# Patient Record
Sex: FEMALE | Race: WHITE | Employment: OTHER
[De-identification: names, ages, dates, MRNs, and addresses within clinical notes are randomized per-mention and may not be internally consistent; named-entity substitution may affect disease eponyms.]

---

## 2017-08-05 ENCOUNTER — HEALTH MAINTENANCE LETTER (OUTPATIENT)
Age: 72
End: 2017-08-05

## 2019-06-14 ENCOUNTER — HOSPITAL ENCOUNTER (INPATIENT)
Facility: CLINIC | Age: 74
LOS: 2 days | Discharge: HOME | DRG: 356 | End: 2019-06-17
Attending: EMERGENCY MEDICINE | Admitting: SURGERY
Payer: COMMERCIAL

## 2019-06-14 DIAGNOSIS — Z98.890 S/P LAPAROSCOPY: ICD-10-CM

## 2019-06-14 DIAGNOSIS — K56.609 SBO (SMALL BOWEL OBSTRUCTION) (H): ICD-10-CM

## 2019-06-14 DIAGNOSIS — K59.00 CONSTIPATION, UNSPECIFIED CONSTIPATION TYPE: Primary | ICD-10-CM

## 2019-06-14 PROCEDURE — 99285 EMERGENCY DEPT VISIT HI MDM: CPT | Mod: 25

## 2019-06-14 PROCEDURE — 93005 ELECTROCARDIOGRAM TRACING: CPT

## 2019-06-15 ENCOUNTER — APPOINTMENT (OUTPATIENT)
Dept: CT IMAGING | Facility: CLINIC | Age: 74
DRG: 356 | End: 2019-06-15
Attending: EMERGENCY MEDICINE
Payer: COMMERCIAL

## 2019-06-15 ENCOUNTER — ANESTHESIA (OUTPATIENT)
Dept: SURGERY | Facility: CLINIC | Age: 74
DRG: 356 | End: 2019-06-15
Payer: COMMERCIAL

## 2019-06-15 ENCOUNTER — ANESTHESIA EVENT (OUTPATIENT)
Dept: SURGERY | Facility: CLINIC | Age: 74
DRG: 356 | End: 2019-06-15
Payer: COMMERCIAL

## 2019-06-15 PROBLEM — K56.609 SMALL BOWEL OBSTRUCTION (H): Status: ACTIVE | Noted: 2019-06-15

## 2019-06-15 LAB
ABO + RH BLD: NORMAL
ABO + RH BLD: NORMAL
ALBUMIN SERPL-MCNC: 3.7 G/DL (ref 3.4–5)
ALBUMIN UR-MCNC: NEGATIVE MG/DL
ALP SERPL-CCNC: 118 U/L (ref 40–150)
ALT SERPL W P-5'-P-CCNC: 22 U/L (ref 0–50)
AMORPH CRY #/AREA URNS HPF: ABNORMAL /HPF
ANION GAP SERPL CALCULATED.3IONS-SCNC: 5 MMOL/L (ref 3–14)
APPEARANCE UR: ABNORMAL
AST SERPL W P-5'-P-CCNC: 22 U/L (ref 0–45)
BASOPHILS # BLD AUTO: 0 10E9/L (ref 0–0.2)
BASOPHILS NFR BLD AUTO: 0.3 %
BILIRUB SERPL-MCNC: 0.3 MG/DL (ref 0.2–1.3)
BILIRUB UR QL STRIP: NEGATIVE
BLD GP AB SCN SERPL QL: NORMAL
BLOOD BANK CMNT PATIENT-IMP: NORMAL
BUN SERPL-MCNC: 23 MG/DL (ref 7–30)
CALCIUM SERPL-MCNC: 9.4 MG/DL (ref 8.5–10.1)
CHLORIDE SERPL-SCNC: 106 MMOL/L (ref 94–109)
CO2 BLDCOV-SCNC: 25 MMOL/L (ref 21–28)
CO2 SERPL-SCNC: 29 MMOL/L (ref 20–32)
COLOR UR AUTO: YELLOW
CREAT BLD-MCNC: 0.7 MG/DL (ref 0.52–1.04)
CREAT SERPL-MCNC: 0.72 MG/DL (ref 0.52–1.04)
DIFFERENTIAL METHOD BLD: ABNORMAL
EOSINOPHIL # BLD AUTO: 0.1 10E9/L (ref 0–0.7)
EOSINOPHIL NFR BLD AUTO: 0.8 %
ERYTHROCYTE [DISTWIDTH] IN BLOOD BY AUTOMATED COUNT: 15.2 % (ref 10–15)
GFR SERPL CREATININE-BSD FRML MDRD: 82 ML/MIN/{1.73_M2}
GFR SERPL CREATININE-BSD FRML MDRD: 83 ML/MIN/{1.73_M2}
GLUCOSE SERPL-MCNC: 142 MG/DL (ref 70–99)
GLUCOSE UR STRIP-MCNC: NEGATIVE MG/DL
HCT VFR BLD AUTO: 38.6 % (ref 35–47)
HGB BLD-MCNC: 12 G/DL (ref 11.7–15.7)
HGB UR QL STRIP: NEGATIVE
IMM GRANULOCYTES # BLD: 0 10E9/L (ref 0–0.4)
IMM GRANULOCYTES NFR BLD: 0.4 %
INTERPRETATION ECG - MUSE: NORMAL
KETONES UR STRIP-MCNC: NEGATIVE MG/DL
LACTATE BLD-SCNC: 0.6 MMOL/L (ref 0.7–2.1)
LEUKOCYTE ESTERASE UR QL STRIP: ABNORMAL
LIPASE SERPL-CCNC: 279 U/L (ref 73–393)
LYMPHOCYTES # BLD AUTO: 1.1 10E9/L (ref 0.8–5.3)
LYMPHOCYTES NFR BLD AUTO: 12.3 %
MCH RBC QN AUTO: 28.6 PG (ref 26.5–33)
MCHC RBC AUTO-ENTMCNC: 31.1 G/DL (ref 31.5–36.5)
MCV RBC AUTO: 92 FL (ref 78–100)
MONOCYTES # BLD AUTO: 0.5 10E9/L (ref 0–1.3)
MONOCYTES NFR BLD AUTO: 5.8 %
MUCOUS THREADS #/AREA URNS LPF: PRESENT /LPF
NEUTROPHILS # BLD AUTO: 7.5 10E9/L (ref 1.6–8.3)
NEUTROPHILS NFR BLD AUTO: 80.4 %
NITRATE UR QL: NEGATIVE
NRBC # BLD AUTO: 0 10*3/UL
NRBC BLD AUTO-RTO: 0 /100
PCO2 BLDV: 46 MM HG (ref 40–50)
PH BLDV: 7.35 PH (ref 7.32–7.43)
PH UR STRIP: 7.5 PH (ref 5–7)
PLATELET # BLD AUTO: 236 10E9/L (ref 150–450)
PO2 BLDV: 63 MM HG (ref 25–47)
POTASSIUM SERPL-SCNC: 3.8 MMOL/L (ref 3.4–5.3)
PROT SERPL-MCNC: 7.2 G/DL (ref 6.8–8.8)
RBC # BLD AUTO: 4.19 10E12/L (ref 3.8–5.2)
RBC #/AREA URNS AUTO: 5 /HPF (ref 0–2)
SAO2 % BLDV FROM PO2: 90 %
SODIUM SERPL-SCNC: 140 MMOL/L (ref 133–144)
SOURCE: ABNORMAL
SP GR UR STRIP: 1.02 (ref 1–1.03)
SPECIMEN EXP DATE BLD: NORMAL
UROBILINOGEN UR STRIP-MCNC: NORMAL MG/DL (ref 0–2)
WBC # BLD AUTO: 9.3 10E9/L (ref 4–11)
WBC #/AREA URNS AUTO: 3 /HPF (ref 0–5)

## 2019-06-15 PROCEDURE — 40000306 ZZH STATISTIC PRE PROC ASSESS II: Performed by: SURGERY

## 2019-06-15 PROCEDURE — 86850 RBC ANTIBODY SCREEN: CPT | Performed by: ANESTHESIOLOGY

## 2019-06-15 PROCEDURE — 37000008 ZZH ANESTHESIA TECHNICAL FEE, 1ST 30 MIN: Performed by: SURGERY

## 2019-06-15 PROCEDURE — 25800030 ZZH RX IP 258 OP 636: Performed by: ANESTHESIOLOGY

## 2019-06-15 PROCEDURE — 27210794 ZZH OR GENERAL SUPPLY STERILE: Performed by: SURGERY

## 2019-06-15 PROCEDURE — 74177 CT ABD & PELVIS W/CONTRAST: CPT

## 2019-06-15 PROCEDURE — 25000128 H RX IP 250 OP 636: Performed by: NURSE ANESTHETIST, CERTIFIED REGISTERED

## 2019-06-15 PROCEDURE — 0WJP4ZZ INSPECTION OF GASTROINTESTINAL TRACT, PERCUTANEOUS ENDOSCOPIC APPROACH: ICD-10-PCS | Performed by: SURGERY

## 2019-06-15 PROCEDURE — 25000128 H RX IP 250 OP 636: Performed by: ANESTHESIOLOGY

## 2019-06-15 PROCEDURE — 96375 TX/PRO/DX INJ NEW DRUG ADDON: CPT

## 2019-06-15 PROCEDURE — 25000128 H RX IP 250 OP 636: Performed by: EMERGENCY MEDICINE

## 2019-06-15 PROCEDURE — 49320 DIAG LAPARO SEPARATE PROC: CPT | Performed by: SURGERY

## 2019-06-15 PROCEDURE — 80053 COMPREHEN METABOLIC PANEL: CPT | Performed by: EMERGENCY MEDICINE

## 2019-06-15 PROCEDURE — 25000128 H RX IP 250 OP 636

## 2019-06-15 PROCEDURE — 36000056 ZZH SURGERY LEVEL 3 1ST 30 MIN: Performed by: SURGERY

## 2019-06-15 PROCEDURE — 86901 BLOOD TYPING SEROLOGIC RH(D): CPT | Performed by: ANESTHESIOLOGY

## 2019-06-15 PROCEDURE — 86900 BLOOD TYPING SEROLOGIC ABO: CPT | Performed by: ANESTHESIOLOGY

## 2019-06-15 PROCEDURE — 96376 TX/PRO/DX INJ SAME DRUG ADON: CPT

## 2019-06-15 PROCEDURE — 25000125 ZZHC RX 250: Performed by: NURSE ANESTHETIST, CERTIFIED REGISTERED

## 2019-06-15 PROCEDURE — 81001 URINALYSIS AUTO W/SCOPE: CPT | Performed by: EMERGENCY MEDICINE

## 2019-06-15 PROCEDURE — 83690 ASSAY OF LIPASE: CPT | Performed by: EMERGENCY MEDICINE

## 2019-06-15 PROCEDURE — 99222 1ST HOSP IP/OBS MODERATE 55: CPT | Mod: 57 | Performed by: SURGERY

## 2019-06-15 PROCEDURE — 25000125 ZZHC RX 250: Performed by: EMERGENCY MEDICINE

## 2019-06-15 PROCEDURE — 25000132 ZZH RX MED GY IP 250 OP 250 PS 637: Performed by: SURGERY

## 2019-06-15 PROCEDURE — 37000009 ZZH ANESTHESIA TECHNICAL FEE, EACH ADDTL 15 MIN: Performed by: SURGERY

## 2019-06-15 PROCEDURE — 82565 ASSAY OF CREATININE: CPT

## 2019-06-15 PROCEDURE — 71000012 ZZH RECOVERY PHASE 1 LEVEL 1 FIRST HR: Performed by: SURGERY

## 2019-06-15 PROCEDURE — 85025 COMPLETE CBC W/AUTO DIFF WBC: CPT | Performed by: EMERGENCY MEDICINE

## 2019-06-15 PROCEDURE — 25000125 ZZHC RX 250: Performed by: SURGERY

## 2019-06-15 PROCEDURE — 93005 ELECTROCARDIOGRAM TRACING: CPT

## 2019-06-15 PROCEDURE — 82803 BLOOD GASES ANY COMBINATION: CPT

## 2019-06-15 PROCEDURE — 25800030 ZZH RX IP 258 OP 636: Performed by: SURGERY

## 2019-06-15 PROCEDURE — 96361 HYDRATE IV INFUSION ADD-ON: CPT

## 2019-06-15 PROCEDURE — 12000013 ZZH R&B PEDS

## 2019-06-15 PROCEDURE — 83605 ASSAY OF LACTIC ACID: CPT

## 2019-06-15 PROCEDURE — 36000058 ZZH SURGERY LEVEL 3 EA 15 ADDTL MIN: Performed by: SURGERY

## 2019-06-15 PROCEDURE — 96365 THER/PROPH/DIAG IV INF INIT: CPT

## 2019-06-15 RX ORDER — SODIUM CHLORIDE, SODIUM LACTATE, POTASSIUM CHLORIDE, CALCIUM CHLORIDE 600; 310; 30; 20 MG/100ML; MG/100ML; MG/100ML; MG/100ML
INJECTION, SOLUTION INTRAVENOUS CONTINUOUS
Status: DISCONTINUED | OUTPATIENT
Start: 2019-06-15 | End: 2019-06-15 | Stop reason: HOSPADM

## 2019-06-15 RX ORDER — GLYCOPYRROLATE 0.2 MG/ML
INJECTION, SOLUTION INTRAMUSCULAR; INTRAVENOUS PRN
Status: DISCONTINUED | OUTPATIENT
Start: 2019-06-15 | End: 2019-06-15

## 2019-06-15 RX ORDER — PROCHLORPERAZINE MALEATE 5 MG
5 TABLET ORAL EVERY 6 HOURS PRN
Status: DISCONTINUED | OUTPATIENT
Start: 2019-06-15 | End: 2019-06-17 | Stop reason: HOSPADM

## 2019-06-15 RX ORDER — BUPIVACAINE HYDROCHLORIDE AND EPINEPHRINE 5; 5 MG/ML; UG/ML
INJECTION, SOLUTION PERINEURAL PRN
Status: DISCONTINUED | OUTPATIENT
Start: 2019-06-15 | End: 2019-06-15 | Stop reason: HOSPADM

## 2019-06-15 RX ORDER — ONDANSETRON 2 MG/ML
INJECTION INTRAMUSCULAR; INTRAVENOUS PRN
Status: DISCONTINUED | OUTPATIENT
Start: 2019-06-15 | End: 2019-06-15

## 2019-06-15 RX ORDER — ONDANSETRON 2 MG/ML
INJECTION INTRAMUSCULAR; INTRAVENOUS
Status: COMPLETED
Start: 2019-06-15 | End: 2019-06-15

## 2019-06-15 RX ORDER — OXYCODONE HYDROCHLORIDE 5 MG/1
5-10 TABLET ORAL EVERY 4 HOURS PRN
Status: DISCONTINUED | OUTPATIENT
Start: 2019-06-15 | End: 2019-06-17 | Stop reason: HOSPADM

## 2019-06-15 RX ORDER — HYDROMORPHONE HYDROCHLORIDE 1 MG/ML
0.5 INJECTION, SOLUTION INTRAMUSCULAR; INTRAVENOUS; SUBCUTANEOUS
Status: DISCONTINUED | OUTPATIENT
Start: 2019-06-15 | End: 2019-06-15

## 2019-06-15 RX ORDER — NALOXONE HYDROCHLORIDE 0.4 MG/ML
.1-.4 INJECTION, SOLUTION INTRAMUSCULAR; INTRAVENOUS; SUBCUTANEOUS
Status: ACTIVE | OUTPATIENT
Start: 2019-06-15 | End: 2019-06-16

## 2019-06-15 RX ORDER — NEOSTIGMINE METHYLSULFATE 1 MG/ML
VIAL (ML) INJECTION PRN
Status: DISCONTINUED | OUTPATIENT
Start: 2019-06-15 | End: 2019-06-15

## 2019-06-15 RX ORDER — FENTANYL CITRATE 50 UG/ML
INJECTION, SOLUTION INTRAMUSCULAR; INTRAVENOUS PRN
Status: DISCONTINUED | OUTPATIENT
Start: 2019-06-15 | End: 2019-06-15

## 2019-06-15 RX ORDER — ONDANSETRON 4 MG/1
4 TABLET, ORALLY DISINTEGRATING ORAL EVERY 6 HOURS PRN
Status: DISCONTINUED | OUTPATIENT
Start: 2019-06-15 | End: 2019-06-17 | Stop reason: HOSPADM

## 2019-06-15 RX ORDER — CEFOTETAN DISODIUM 1 G/10ML
1 INJECTION, POWDER, FOR SOLUTION INTRAMUSCULAR; INTRAVENOUS ONCE
Status: COMPLETED | OUTPATIENT
Start: 2019-06-15 | End: 2019-06-15

## 2019-06-15 RX ORDER — NALOXONE HYDROCHLORIDE 0.4 MG/ML
.1-.4 INJECTION, SOLUTION INTRAMUSCULAR; INTRAVENOUS; SUBCUTANEOUS
Status: DISCONTINUED | OUTPATIENT
Start: 2019-06-15 | End: 2019-06-15

## 2019-06-15 RX ORDER — HYDROMORPHONE HYDROCHLORIDE 1 MG/ML
0.5 INJECTION, SOLUTION INTRAMUSCULAR; INTRAVENOUS; SUBCUTANEOUS ONCE
Status: COMPLETED | OUTPATIENT
Start: 2019-06-15 | End: 2019-06-15

## 2019-06-15 RX ORDER — LIDOCAINE 40 MG/G
CREAM TOPICAL
Status: DISCONTINUED | OUTPATIENT
Start: 2019-06-15 | End: 2019-06-17 | Stop reason: HOSPADM

## 2019-06-15 RX ORDER — IOPAMIDOL 755 MG/ML
500 INJECTION, SOLUTION INTRAVASCULAR ONCE
Status: COMPLETED | OUTPATIENT
Start: 2019-06-15 | End: 2019-06-15

## 2019-06-15 RX ORDER — DEXTROSE MONOHYDRATE, SODIUM CHLORIDE, AND POTASSIUM CHLORIDE 50; 1.49; 4.5 G/1000ML; G/1000ML; G/1000ML
INJECTION, SOLUTION INTRAVENOUS CONTINUOUS
Status: DISCONTINUED | OUTPATIENT
Start: 2019-06-15 | End: 2019-06-16

## 2019-06-15 RX ORDER — ACETAMINOPHEN 325 MG/1
975 TABLET ORAL EVERY 8 HOURS
Status: DISCONTINUED | OUTPATIENT
Start: 2019-06-15 | End: 2019-06-17 | Stop reason: HOSPADM

## 2019-06-15 RX ORDER — FENTANYL CITRATE 50 UG/ML
25-50 INJECTION, SOLUTION INTRAMUSCULAR; INTRAVENOUS
Status: DISCONTINUED | OUTPATIENT
Start: 2019-06-15 | End: 2019-06-15 | Stop reason: HOSPADM

## 2019-06-15 RX ORDER — ONDANSETRON 4 MG/1
4 TABLET, ORALLY DISINTEGRATING ORAL EVERY 30 MIN PRN
Status: DISCONTINUED | OUTPATIENT
Start: 2019-06-15 | End: 2019-06-15 | Stop reason: HOSPADM

## 2019-06-15 RX ORDER — ONDANSETRON 2 MG/ML
4 INJECTION INTRAMUSCULAR; INTRAVENOUS EVERY 30 MIN PRN
Status: DISCONTINUED | OUTPATIENT
Start: 2019-06-15 | End: 2019-06-15 | Stop reason: HOSPADM

## 2019-06-15 RX ORDER — DEXAMETHASONE SODIUM PHOSPHATE 4 MG/ML
INJECTION, SOLUTION INTRA-ARTICULAR; INTRALESIONAL; INTRAMUSCULAR; INTRAVENOUS; SOFT TISSUE PRN
Status: DISCONTINUED | OUTPATIENT
Start: 2019-06-15 | End: 2019-06-15

## 2019-06-15 RX ORDER — ONDANSETRON 2 MG/ML
4 INJECTION INTRAMUSCULAR; INTRAVENOUS EVERY 6 HOURS PRN
Status: DISCONTINUED | OUTPATIENT
Start: 2019-06-15 | End: 2019-06-17 | Stop reason: HOSPADM

## 2019-06-15 RX ORDER — HYDROMORPHONE HYDROCHLORIDE 1 MG/ML
INJECTION, SOLUTION INTRAMUSCULAR; INTRAVENOUS; SUBCUTANEOUS
Status: COMPLETED
Start: 2019-06-15 | End: 2019-06-15

## 2019-06-15 RX ORDER — HYDROMORPHONE HYDROCHLORIDE 1 MG/ML
.3-.5 INJECTION, SOLUTION INTRAMUSCULAR; INTRAVENOUS; SUBCUTANEOUS EVERY 5 MIN PRN
Status: DISCONTINUED | OUTPATIENT
Start: 2019-06-15 | End: 2019-06-15 | Stop reason: HOSPADM

## 2019-06-15 RX ORDER — IBUPROFEN 200 MG
200-400 TABLET ORAL PRN
COMMUNITY

## 2019-06-15 RX ORDER — SODIUM CHLORIDE 9 MG/ML
1000 INJECTION, SOLUTION INTRAVENOUS CONTINUOUS
Status: DISCONTINUED | OUTPATIENT
Start: 2019-06-15 | End: 2019-06-15

## 2019-06-15 RX ORDER — MULTIVITAMIN WITH IRON
1 TABLET ORAL DAILY PRN
COMMUNITY

## 2019-06-15 RX ORDER — HYDROMORPHONE HYDROCHLORIDE 1 MG/ML
.3-.5 INJECTION, SOLUTION INTRAMUSCULAR; INTRAVENOUS; SUBCUTANEOUS
Status: DISCONTINUED | OUTPATIENT
Start: 2019-06-15 | End: 2019-06-17 | Stop reason: HOSPADM

## 2019-06-15 RX ORDER — LIDOCAINE HYDROCHLORIDE 10 MG/ML
INJECTION, SOLUTION INFILTRATION; PERINEURAL PRN
Status: DISCONTINUED | OUTPATIENT
Start: 2019-06-15 | End: 2019-06-15

## 2019-06-15 RX ORDER — ACETAMINOPHEN 325 MG/1
650 TABLET ORAL EVERY 4 HOURS PRN
Status: DISCONTINUED | OUTPATIENT
Start: 2019-06-18 | End: 2019-06-17 | Stop reason: HOSPADM

## 2019-06-15 RX ORDER — ONDANSETRON 2 MG/ML
4 INJECTION INTRAMUSCULAR; INTRAVENOUS ONCE
Status: COMPLETED | OUTPATIENT
Start: 2019-06-15 | End: 2019-06-15

## 2019-06-15 RX ORDER — LABETALOL HYDROCHLORIDE 5 MG/ML
10 INJECTION, SOLUTION INTRAVENOUS
Status: DISCONTINUED | OUTPATIENT
Start: 2019-06-15 | End: 2019-06-15 | Stop reason: HOSPADM

## 2019-06-15 RX ORDER — ONDANSETRON 2 MG/ML
4 INJECTION INTRAMUSCULAR; INTRAVENOUS EVERY 30 MIN PRN
Status: DISCONTINUED | OUTPATIENT
Start: 2019-06-15 | End: 2019-06-15

## 2019-06-15 RX ORDER — MELOXICAM 15 MG/1
15 TABLET ORAL DAILY
COMMUNITY
Start: 2019-05-28

## 2019-06-15 RX ORDER — PROPOFOL 10 MG/ML
INJECTION, EMULSION INTRAVENOUS PRN
Status: DISCONTINUED | OUTPATIENT
Start: 2019-06-15 | End: 2019-06-15

## 2019-06-15 RX ADMIN — CEFOTETAN DISODIUM 1 G: 1 INJECTION, POWDER, FOR SOLUTION INTRAMUSCULAR; INTRAVENOUS at 02:44

## 2019-06-15 RX ADMIN — SODIUM CHLORIDE 1000 ML: 9 INJECTION, SOLUTION INTRAVENOUS at 00:17

## 2019-06-15 RX ADMIN — ONDANSETRON 4 MG: 2 INJECTION INTRAMUSCULAR; INTRAVENOUS at 04:20

## 2019-06-15 RX ADMIN — GLYCOPYRROLATE 0.6 MG: 0.2 INJECTION, SOLUTION INTRAMUSCULAR; INTRAVENOUS at 04:41

## 2019-06-15 RX ADMIN — HYDROMORPHONE HYDROCHLORIDE 0.5 MG: 1 INJECTION, SOLUTION INTRAMUSCULAR; INTRAVENOUS; SUBCUTANEOUS at 01:53

## 2019-06-15 RX ADMIN — FENTANYL CITRATE 100 MCG: 50 INJECTION, SOLUTION INTRAMUSCULAR; INTRAVENOUS at 03:53

## 2019-06-15 RX ADMIN — POTASSIUM CHLORIDE, DEXTROSE MONOHYDRATE AND SODIUM CHLORIDE: 150; 5; 450 INJECTION, SOLUTION INTRAVENOUS at 16:49

## 2019-06-15 RX ADMIN — HYDROMORPHONE HYDROCHLORIDE 0.5 MG: 1 INJECTION, SOLUTION INTRAMUSCULAR; INTRAVENOUS; SUBCUTANEOUS at 00:17

## 2019-06-15 RX ADMIN — Medication 3 MG: at 04:41

## 2019-06-15 RX ADMIN — SODIUM CHLORIDE, POTASSIUM CHLORIDE, SODIUM LACTATE AND CALCIUM CHLORIDE: 600; 310; 30; 20 INJECTION, SOLUTION INTRAVENOUS at 05:17

## 2019-06-15 RX ADMIN — ONDANSETRON HYDROCHLORIDE 4 MG: 2 INJECTION, SOLUTION INTRAMUSCULAR; INTRAVENOUS at 00:17

## 2019-06-15 RX ADMIN — HYDROMORPHONE HYDROCHLORIDE 0.5 MG: 1 INJECTION, SOLUTION INTRAMUSCULAR; INTRAVENOUS; SUBCUTANEOUS at 04:18

## 2019-06-15 RX ADMIN — ONDANSETRON 4 MG: 2 INJECTION INTRAMUSCULAR; INTRAVENOUS at 00:17

## 2019-06-15 RX ADMIN — DEXAMETHASONE SODIUM PHOSPHATE 4 MG: 4 INJECTION, SOLUTION INTRA-ARTICULAR; INTRALESIONAL; INTRAMUSCULAR; INTRAVENOUS; SOFT TISSUE at 03:53

## 2019-06-15 RX ADMIN — ACETAMINOPHEN 975 MG: 325 TABLET, FILM COATED ORAL at 11:38

## 2019-06-15 RX ADMIN — SODIUM CHLORIDE, POTASSIUM CHLORIDE, SODIUM LACTATE AND CALCIUM CHLORIDE: 600; 310; 30; 20 INJECTION, SOLUTION INTRAVENOUS at 03:46

## 2019-06-15 RX ADMIN — GLYCOPYRROLATE 0.2 MG: 0.2 INJECTION, SOLUTION INTRAMUSCULAR; INTRAVENOUS at 03:53

## 2019-06-15 RX ADMIN — SODIUM CHLORIDE 58 ML: 9 INJECTION, SOLUTION INTRAVENOUS at 00:58

## 2019-06-15 RX ADMIN — MIDAZOLAM 1 MG: 1 INJECTION INTRAMUSCULAR; INTRAVENOUS at 03:46

## 2019-06-15 RX ADMIN — ACETAMINOPHEN 975 MG: 325 TABLET, FILM COATED ORAL at 18:49

## 2019-06-15 RX ADMIN — PROPOFOL 160 MG: 10 INJECTION, EMULSION INTRAVENOUS at 03:53

## 2019-06-15 RX ADMIN — IOPAMIDOL 71 ML: 755 INJECTION, SOLUTION INTRAVENOUS at 00:58

## 2019-06-15 RX ADMIN — ROCURONIUM BROMIDE 10 MG: 10 INJECTION INTRAVENOUS at 04:25

## 2019-06-15 RX ADMIN — HYDROMORPHONE HYDROCHLORIDE 0.3 MG: 1 INJECTION, SOLUTION INTRAMUSCULAR; INTRAVENOUS; SUBCUTANEOUS at 05:34

## 2019-06-15 RX ADMIN — LIDOCAINE HYDROCHLORIDE 30 MG: 10 INJECTION, SOLUTION INFILTRATION; PERINEURAL at 03:53

## 2019-06-15 RX ADMIN — ROCURONIUM BROMIDE 40 MG: 10 INJECTION INTRAVENOUS at 03:53

## 2019-06-15 RX ADMIN — POTASSIUM CHLORIDE, DEXTROSE MONOHYDRATE AND SODIUM CHLORIDE: 150; 5; 450 INJECTION, SOLUTION INTRAVENOUS at 07:21

## 2019-06-15 ASSESSMENT — ACTIVITIES OF DAILY LIVING (ADL)
RETIRED_EATING: 0-->INDEPENDENT
SWALLOWING: 0-->SWALLOWS FOODS/LIQUIDS WITHOUT DIFFICULTY
TOILETING: 0-->INDEPENDENT
COGNITION: 0 - NO COGNITION ISSUES REPORTED
BATHING: 0-->INDEPENDENT
TRANSFERRING: 0-->INDEPENDENT
FALL_HISTORY_WITHIN_LAST_SIX_MONTHS: NO
DRESS: 0-->INDEPENDENT
AMBULATION: 0-->INDEPENDENT
RETIRED_COMMUNICATION: 0-->UNDERSTANDS/COMMUNICATES WITHOUT DIFFICULTY

## 2019-06-15 ASSESSMENT — ENCOUNTER SYMPTOMS
DYSURIA: 0
VOMITING: 0
ABDOMINAL DISTENTION: 1
CONSTIPATION: 1
NAUSEA: 1

## 2019-06-15 NOTE — OP NOTE
General Surgery Operative Note    Pre-operative diagnosis:  strangulated left femoral hernia with small bowel obstruction (acutely ischemic small bowel)   Post-operative diagnosis:  Same   Procedure:  Diagnostic laparoscopy and reduction of strangulated left femoral hernia   Surgeon: Fabio Samuels MD   Assistant(s): NONE   Anesthesia: General    Estimated blood loss: 2 cc's   Drains placed: None   Complications:  None   Findings:   Approximately 8 cm of small bowel strangulated within a left femoral hernia.  This was reduced laparoscopically and the bowel was watched for approximately 20 minutes.  It gradually began to show signs of perfusion.  It was felt that this would likely recover without incident, and therefore was not resected.  As discussed prior to surgery, the patient will require repair of her femoral hernia in the near future using a robotic technique.     Indications for operation: This is a 73-year-old woman who presented a day after a colonoscopy with painful swelling in the left groin.  CT scan showed an incarcerated left femoral hernia.  This was not reducible and laparoscopy for reduction of the hernia was therefore recommended to the patient.  We discussed the procedure, along with its risks and complications, in detail.  She agreed to proceed.  We specifically discussed the likelihood that the patient would require a second surgery for definitive repair of her femoral hernia.    Details of the operation: After informed consent, the patient was taken to the operating room where she underwent satisfactory induction of general anesthesia.  The patient was sterilely prepped and draped in a right upper quadrant skin incision was made.  A 5 mm optical entry port was used to easily access to the abdomen.  Pneumoperitoneum was achieved using CO2 insufflation, and an additional 5 mm port was placed in the right lower quadrant.  The incarcerated femoral hernia was immediately evident.  With traction  on the bowel, we began to see some significantly discolored intestine within the hernia, indicating that this was strangulated.  A second 5 mm port was placed in the right lower quadrant and we placed gentle traction on the bowel until it eventually reduced.  There was a rush of fluid with it.  The bowel which had been in the hernia was discolored and purplish.  We watch this for about 20 minutes as it gradually lightened and became convincingly perfused.  Once I was convinced that this was almost certainly viable, the trocar sites were infiltrated with 0.5% Marcaine with epinephrine.  Pneumoperitoneum was released and the trochars were removed.  The skin incisions were closed using 4-0 subcuticular Vicryl followed by Steri-Strips.    The patient tolerated the procedure well and was transferred to the recovery room in satisfactory condition.  Sponge and needle counts were correct at the close of the case.        Specimens: * No specimens in log *        Fabio Samuels MD

## 2019-06-15 NOTE — ED PROVIDER NOTES
"  History     Chief Complaint:    Abdominal Pain        HPI   Danielle Marmolejo is a 73 year old female who presents to the emergency department for evaluation of abdominal pain. The patient states that she had a routine colonoscopy yesterday afternoon, impression as noted below. Since then, she reports that she has had a \"gurgling\" sensation in her abdomen, lower abdominal pain, and has been unable to pass gas. She states that her abdominal pain has been spreading up her lower abdomen and compared it to labor contractions. She also reports that she has been unable to have a bowel movement and mild nausea tonight. She otherwise denies any vomit, dysuria, or history of abdominal surgeries.     Colonoscopy 6/14/19 Impression per Michael Javier MD:  -Diverticulosis in the sigmoid colon, in the descending colon, in the transverse colon, at the hepatic flexure and in the ascending colon.  - The distal rectum and anal verge are normal on retroflexion view.  - The examination was otherwise normal.  - No specimens collected.  - Note that it was difficult for the patient to refrain from bearing down despite increased sedation. Would recommend Propofol for future colonoscopy.    Allergies:  Sulfa drugs      Medications:    The patient is not currently taking any prescribed medications.       Past Medical History:    Osteopenia  Colonic polyps  Sciatica    Past Surgical History:    Laparoscopy for uterine polyp  [place] teeth extraction    Family History:    Mother: Cervical cancer  Father: RA  Sister: Breast cancer    Social History:  Former smoker   Casual alcohol use  Marital Status:   [2]       Review of Systems   Gastrointestinal: Positive for abdominal distention, constipation and nausea. Negative for vomiting.   Genitourinary: Negative for dysuria.   All other systems reviewed and are negative.        Physical Exam   First Vitals:  BP: 167/81  Pulse: 78  Heart Rate: 78  Temp: 97.6  F (36.4  C)  Resp: 18  SpO2: 100 " %      Physical Exam  Constitutional:  Oriented to person, place, and time.   HENT:   Head:    Normocephalic.   Mouth/Throat:   Oropharynx is clear and moist.   Eyes:    EOM are normal. Pupils are equal, round, and reactive to light.   Neck:    Neck supple.   Cardiovascular:  Normal rate, regular rhythm and normal heart sounds.      Exam reveals no gallop and no friction rub.       No murmur heard.  Pulmonary/Chest:  Effort normal and breath sounds normal.      No respiratory distress. No wheezes. No rales.      No reproducible chest wall pain.  Abdominal:   Bulge to left inguinals region, tender to palpation.   Musculoskeletal:  Normal range of motion.   Neurological:   Alert and oriented to person, place, and time.           Moves all 4 extremities spontaneously    Skin:    No rash noted. No pallor.     Emergency Department Course   EKG:   Indication: Pre-op  Time: 0256  Vent. Rate 89 bpm. AZ interval 182. QRS duration 84. QT/QTc 398/484. P-R-T axis 71 76 66.  Normal sinus rhythm. Possible left atrial enlargement. Borderline ECG. Read time: 0300    Imaging:  Radiographic findings were communicated with the patient who voiced understanding of the findings.  CT Abdomen pelvis with contrast:   1. Mechanical small bowel obstruction due to an incarcerated small  bowel loop in a left internal hernia. Recommend surgical consultation.  2. Small low-dense area in the pancreatic body which may be some focal  duct dilatation or cystic change. This is not an acute finding.  Recommend follow-up outpatient MRCP as per radiology.    Laboratory:  CBC: WBC: 9.3, HGB: 12.0, PLT: 236  CMP: Glucose 142 (H) o/w WNL (Creatinine: 0.72)    UA with Microscopic: pH urine 7.5 (H), Leukocyte esterase Trace (A), RBC 5 (H), Mucous present (A), Amorphous crystals few (A) o/w WNL  Lipase 279  ABO/Rh: O+, antibody negative  Creatinine POCT 0.7, GFR 82  0230 ISTAT VBG: pH 7.35 / PCO2 46 / PO2 63 (H) / Bicarb 25 / O2 sat 90/ lactic acid 0.6  (L)  ISTAT gases lactate bryan POCT: PO2 venous 63 (H). Lactic acid 0.6 (L)    Interventions:  0017  Zofran 4 mg IV  0017  NS 1,000 mLs  IV  0017  Dilaudid 0.5 mg IV  0153  Dilaudid 0.5 mg IV  0244 Cefotetan 1 g IV    Emergency Department Course:  Nursing notes and vitals reviewed. (0011) I performed an exam of the patient as documented above.     EKG obtained in the ED, see results above.     IV inserted. Medicine administered as documented above. Blood drawn. This was sent to the lab for further testing, results above.     The patient was sent for a abdomen pelvis CT while in the emergency department, findings above.     0142 I consulted with Dr. Samuels, general surgery, regarding the patient's history and presentation here in the emergency department.    0155  Attempted manual reduction of patient's hernia with adequate pain control. This was unfortunately unsuccessful. Second attempt by colleague Dr. Parikh also unsuccessful.    0217  I consulted with Dr. Samuels, general surgery, regarding attempts to reduce the patient's hernia.     0240 I rechecked the patient and discussed the results of her workup thus far.     Patient sent to OR.     Impression & Plan    Medical Decision Making:  Ms. Marmolejo 73 year old female that came in complaining of generalized abdominal pain post colonoscopy. Differential includes colitis, ileus, small bowel obstruction, or other causes. Fortunately, CT does show she has suprisingly an inguinal and or femoral left hernia that is incarcerated likely causing small bowel obstruction. I attempted manual reduction as well as a colleague of mine that was unfortunately unsuccessful. Case was discuss with Dr. Samuels from surgery on call who reviewed CT imaging and agrees with findings that patient will need surgical management. Plan is for patient to go on for surgical management and intraoperative reduction of patient's hernia.     Diagnosis:    ICD-10-CM    1. SBO (small bowel  obstruction) (H) K56.609 Lactic acid whole blood     ISTAT gases lactate bryan POCT     ISTAT gases lactate bryan POCT     ABO/Rh type and screen     CANCELED: Lactic acid whole blood       Disposition:  Sent to OR.     Scribe Disclosure:  SUMAN, Alexandria PALOMINO, am serving as a scribe on 6/15/2019 at 12:11 AM to personally document services performed by Aditya Wilson MD based on my observations and the provider's statements to me.     Scribe Disclosure:  I,  Reuben Kohler, am serving as a scribe on 6/15/2019 at 2:52 AM to personally document services performed by Aditya Wilson MD based on my observations and the provider's statements to me.         Alexandria PALOMINO  6/14/2019   [facility] EMERGENCY DEPARTMENT       Aditya Wilson MD  06/15/19 0426

## 2019-06-15 NOTE — ANESTHESIA PREPROCEDURE EVALUATION
Anesthesia Pre-Procedure Evaluation    Patient: Danielle Marmolejo   MRN: [id number] : 1945          Preoperative Diagnosis: stragulated hernia    Procedure(s):  LAPAROSCOPY, DIAGNOSTIC, BY GENERAL SURGERY  EXCISION, SMALL INTESTINE, LAPAROSCOPIC    Past Medical History:   Diagnosis Date     Acne      Dermatitis      Past Surgical History:   Procedure Laterality Date     SURGICAL HISTORY OF -       lap done for polyp blocking ovaries-not cancerous or precancerous     Anesthesia Evaluation     . Pt has had prior anesthetic. Type: General    No history of anesthetic complications          ROS/MED HX    ENT/Pulmonary:  - neg pulmonary ROS     Neurologic:  - neg neurologic ROS     Cardiovascular:  - neg cardiovascular ROS       METS/Exercise Tolerance:     Hematologic:  - neg hematologic  ROS       Musculoskeletal:   (+)  other musculoskeletal ( Osteoporosis)-       GI/Hepatic:     (+) Other GI/Hepatic incarcerated inguinal hernia      Renal/Genitourinary:  - ROS Renal section negative       Endo:  - neg endo ROS       Psychiatric:  - neg psychiatric ROS       Infectious Disease:  - neg infectious disease ROS       Malignancy:         Other:    - neg other ROS                      Physical Exam  Normal systems: cardiovascular, pulmonary and dental    Airway   Mallampati: II  TM distance: >3 FB  Neck ROM: full    Dental     Cardiovascular       Pulmonary             Lab Results   Component Value Date    WBC 9.3 06/15/2019    HGB 12.0 06/15/2019    HCT 38.6 06/15/2019     06/15/2019    CRP 5.3 2011     06/15/2019    POTASSIUM 3.8 06/15/2019    CHLORIDE 106 06/15/2019    CO2 29 06/15/2019    BUN 23 06/15/2019    CR 0.72 06/15/2019     (H) 06/15/2019    AMBER 9.4 06/15/2019    ALBUMIN 3.7 06/15/2019    PROTTOTAL 7.2 06/15/2019    ALT 22 06/15/2019    AST 22 06/15/2019    ALKPHOS 118 06/15/2019    BILITOTAL 0.3 06/15/2019    LIPASE 279 06/15/2019    TSH 0.51 2012    T4 0.82  "08/19/2008       Preop Vitals  BP Readings from Last 3 Encounters:   06/15/19 150/78   06/09/15 123/74   06/08/15 130/78    Pulse Readings from Last 3 Encounters:   06/15/19 100   06/09/15 79   06/08/15 67      Resp Readings from Last 3 Encounters:   06/15/19 20   05/14/15 18   07/25/13 14    SpO2 Readings from Last 3 Encounters:   06/15/19 97%   06/08/15 98%   05/14/15 98%      Temp Readings from Last 1 Encounters:   06/14/19 97.6  F (36.4  C) (Oral)    Ht Readings from Last 1 Encounters:   06/09/15 1.702 m (5' 7\")      Wt Readings from Last 1 Encounters:   06/09/15 64.2 kg (141 lb 7 oz)    Estimated body mass index is 22.15 kg/m  as calculated from the following:    Height as of 6/9/15: 1.702 m (5' 7\").    Weight as of 6/9/15: 64.2 kg (141 lb 7 oz).       Anesthesia Plan      History & Physical Review  History and physical reviewed and following examination; no interval change.    ASA Status:  2 emergent.    NPO Status:  > 8 hours    Plan for General, RSI and ETT with Intravenous induction. Maintenance will be Balanced.    PONV prophylaxis:  Ondansetron (or other 5HT-3) and Dexamethasone or Solumedrol       Postoperative Care  Postoperative pain management:  IV analgesics, Oral pain medications and Multi-modal analgesia.      Consents  Anesthetic plan, risks, benefits and alternatives discussed with:  Patient..                 Bhupinder Iglesias MD                    .  "

## 2019-06-15 NOTE — PLAN OF CARE
Vital signs: Stable; B/P: 135/68, Temp: 98.3, HR: 84, RR: 18  Pain Control: Tylenol Scheduled. Ice packs for comfort.  Diet: Tolerating clear liquids without pain or nausea.  Output: Voiding adequately. No BM or flatus  Activity: Up in room and halls with stand by assist.  Updates: Incisions clean, dry and intact. Abdomen flat. Active BS. Mart out at 1645, waiting for first void.  Plan: Continue to monitor and assess VS/pain.

## 2019-06-15 NOTE — ANESTHESIA CARE TRANSFER NOTE
Patient: Danielle Marmolejo    Procedure(s):  LAPAROSCOPY, DIAGNOSTIC, reduction of strangulated left femoral hernia    Diagnosis: stragulated hernia  Diagnosis Additional Information: No value filed.    Anesthesia Type:   General, RSI, ETT     Note:  Airway :Face Mask  Patient transferred to:PACU        Vitals: (Last set prior to Anesthesia Care Transfer)    CRNA VITALS  6/15/2019 0426 - 6/15/2019 0457      6/15/2019             Pulse:  97    SpO2:  98 %                Electronically Signed By: MARIA A Gross CRNA  Gely 15, 2019  4:57 AM

## 2019-06-15 NOTE — ANESTHESIA POSTPROCEDURE EVALUATION
Patient: Danielle Marmolejo    Procedure(s):  LAPAROSCOPY, DIAGNOSTIC, reduction of strangulated left femoral hernia    Diagnosis:stragulated hernia  Diagnosis Additional Information: No value filed.    Anesthesia Type:  General, RSI, ETT    Note:  Anesthesia Post Evaluation    Patient location during evaluation: PACU  Patient participation: Able to fully participate in evaluation  Level of consciousness: awake and alert  Pain management: adequate  Airway patency: patent  Cardiovascular status: acceptable  Respiratory status: acceptable  Hydration status: acceptable  PONV: none     Anesthetic complications: None          Last vitals:  Vitals:    06/15/19 0505 06/15/19 0510 06/15/19 0515   BP: 121/89 139/69    Pulse: 93 83    Resp: 22 14 9   Temp:      SpO2: 100% 100% 100%         Electronically Signed By: Bhupinder Iglesias MD  Gely 15, 2019  5:27 AM

## 2019-06-15 NOTE — PHARMACY-ADMISSION MEDICATION HISTORY
Admission medication history interview status for this patient is complete. See [facility] admission navigator for allergy information, prior to admission medications and immunization status.     Medication history interview source(s):Patient  Medication history resources (including written lists, pill bottles, clinic record): [facility] Care Everywhere and [facility] dispense records  Primary pharmacy: [facility]/pharmacy #[id number] - [place] - [address] AT [address]SECTION OF [facility]      Changes made to PTA medication list:  Added: Magnesium 250mg; diphenhydramine 25mg;  Deleted: Probiotic;  Changed: None    Actions taken by pharmacist (provider contacted, etc): Counseled patient to not use ibuprofen and meloxicam concurrently. Updated MD.     Additional medication history information:None    Medication reconciliation/reorder completed by provider prior to medication history? Yes    Do you take OTC medications (eg tylenol, ibuprofen, fish oil, eye/ear drops, etc)? Listed.       Prior to Admission medications    Medication Sig Last Dose Taking? Auth Provider   calcium carbonate 600 mg-vitamin D 400 units (CALTRATE) 600-400 MG-UNIT per tablet Take 2 tablets by mouth daily 6/14/2019 at Unknown time Yes Unknown, Entered By History   Cranberry 250 MG TABS Take 1 tablet by mouth 6/14/2019 at Unknown time Yes Unknown, Entered By History   diphenhydrAMINE (SLEEP AID, DIPHENHYDRAMINE,) 25 MG tablet Take 12.5 mg by mouth nightly as needed 6/13/2019 at HS Yes Unknown, Entered By History   fish oil-omega-3 fatty acids (OMEGA 3) 1000 MG capsule Take 1 capsule (1 g) by mouth daily 6/14/2019 at Unknown time Yes Anu Masters MD   ibuprofen (ADVIL/MOTRIN) 200 MG tablet Take 200-400 mg by mouth as needed 6/14/2019 at Unknown time Yes Reported, Patient   magnesium 250 MG tablet Take 1 tablet by mouth daily as needed (Constipation) Past Month at Unknown time Yes Unknown, Entered By History   meloxicam (MOBIC) 15 MG tablet Take 15 mg by mouth  daily  6/14/2019 at Unknown time Yes Reported, Patient   Multiple Vitamin (MULTIVITAMINS PO) Take  by mouth daily. 6/14/2019 at Unknown time Yes Reported, Patient           Beau Snider, PharmD./PGY-1 Resident

## 2019-06-15 NOTE — PLAN OF CARE
Arrived to Peds from PACU at 0615.  Alert, but wanting to sleep.  VSS, afebrile.  Bowel sounds positive.  Mart patent for pale yellow urine.  Laparoscopic sites clean, dry, and intact.

## 2019-06-15 NOTE — ED NOTES
[facility]  ED Nurse Handoff Report    Danielle Marmolejo is a 73 year old female   ED Chief complaint: Abdominal Pain  . ED Diagnosis:   Final diagnoses:   SBO (small bowel obstruction) (H)     Allergies:   Allergies   Allergen Reactions     Sulfa Drugs      rash       Code Status: Full Code  Activity level - Baseline/Home:  Independent. Activity Level - Current:   Independent. Lift room needed: No. Bariatric: No   Needed: No   Isolation: No. Infection: Not Applicable.     Vital Signs:   Vitals:    06/14/19 2344 06/15/19 0000 06/15/19 0200   BP: 167/81 156/85 158/89   Pulse: 78 78 96   Resp: 18     Temp: 97.6  F (36.4  C)     TempSrc: Oral     SpO2: 100% 100% 97%       Cardiac Rhythm:  ,      Pain level: 0-10 Pain Scale: 7  Patient confused: No. Patient Falls Risk: Yes.   Elimination Status: Has voided   Patient Report - Initial Complaint: abdominal pain. Focused Assessment:   23:42 ED Triage Notes Filed Had routine colonoscopy today. Around 8:30, patient developed abdominal soreness and getting worst, unable to pass gas and have BM. Advil and gas-x taken prior to arrival that did no help. ABCs intact.      02:12 Gastrointestinal Gastrointestinal - GI WDL: -WDL except (C/O sudden onset abdominal pain aprox 12 hours after colonoscopy. Pt WDL able to eat all day without pain. Pain worsens with palpation RLQ. Active BS) All Quadrants Bowel Sounds: hypoactive  All Quadrants Abdominal Palpation: tender; guarding      Tests Performed: labs, imaging. Abnormal Results:   Labs Ordered and Resulted from Time of ED Arrival Up to the Time of Departure from the ED   CBC WITH PLATELETS DIFFERENTIAL - Abnormal; Notable for the following components:       Result Value    MCHC 31.1 (*)     RDW 15.2 (*)     All other components within normal limits   COMPREHENSIVE METABOLIC PANEL - Abnormal; Notable for the following components:    Glucose 142 (*)     All other components within normal limits   ROUTINE UA  WITH MICROSCOPIC - Abnormal; Notable for the following components:    pH Urine 7.5 (*)     Leukocyte Esterase Urine Trace (*)     RBC Urine 5 (*)     Mucous Urine Present (*)     Amorphous Crystals Few (*)     All other components within normal limits   LIPASE   CREATININE POCT   PERIPHERAL IV CATHETER   FREE WATER   ISTAT CREATININE NURSING POCT     CT Abdomen Pelvis w Contrast   Final Result   IMPRESSION:   1. Mechanical small bowel obstruction due to an incarcerated small   bowel loop in a left internal hernia. Recommend surgical consultation.   2. Small low-dense area in the pancreatic body which may be some focal   duct dilatation or cystic change. This is not an acute finding.   Recommend follow-up outpatient MRCP.      STEPHEN LYNN MD        .   Treatments provided: pain control, fluids  Family Comments: spouse at bedside  OBS brochure/video discussed/provided to patient:  No  ED Medications:   Medications   0.9% sodium chloride BOLUS (0 mLs Intravenous Stopped 6/15/19 0153)     Followed by   sodium chloride 0.9% infusion (has no administration in time range)   HYDROmorphone (PF) (DILAUDID) injection 0.5 mg (0.5 mg Intravenous Given 6/15/19 0153)   ondansetron (ZOFRAN) injection 4 mg (has no administration in time range)   ondansetron (ZOFRAN) injection 4 mg (4 mg Intravenous Given 6/15/19 0017)   HYDROmorphone (PF) (DILAUDID) injection 0.5 mg (0.5 mg Intravenous Given 6/15/19 0017)   0.9% sodium chloride BOLUS (0 mLs Intravenous Stopped 6/15/19 0059)   iopamidol (ISOVUE-370) solution 500 mL (71 mLs Intravenous Given 6/15/19 0058)     Drips infusing:  No  For the majority of the shift, the patient's behavior Green. Interventions performed were n/a.     Severe Sepsis OR Septic Shock Diagnosis Present: No      ED Nurse Name/Phone Number: Isabella Heard,   2:14 AM

## 2019-06-15 NOTE — ED TRIAGE NOTES
Had routine colonoscopy today. Around 8:30, patient developed abdominal soreness and getting worst, unable to pass gas and have BM. Advil and gas-x taken prior to arrival that did no help. ABCs intact.

## 2019-06-15 NOTE — PROGRESS NOTES
[facility]   General Surgery Progress Note         Assessment and Plan:   Assessment:   POD#0 s/p Procedure(s):  LAPAROSCOPY, DIAGNOSTIC, reduction of strangulated left femoral hernia causing SBO.      Plan:   -Ok for clear liquids  -Increase activity as tolerated  -Pain Mgmt: IV dilaudid prn, tylenol  -Monitor bowel function  -Disposition: Potentially home later today vs tomorrow depending on recovery.  -Will need femoral hernia repaired in the near future as outpatient  -Pancreatic cyst seen on CT, will need outpatient MRI         Interval History:   Resting in bed. Denies pain. Has not been up walking yet. +Voiding. +NPO. No flatus.         Physical Exam:   Blood pressure 137/72, pulse 84, temperature 98  F (36.7  C), temperature source Oral, resp. rate 16, SpO2 99 %, not currently breastfeeding.    I/O last 3 completed shifts:  In: 300 [I.V.:300]  Out: 550 [Urine:550]    Abdomen: soft, ND, NT, +BS  Inc(s) - clean, dry, intact with steristrips, no erythema            Data:     Recent Labs   Lab Test 06/15/19  0001 08/26/14  1129 04/26/12  1042   HGB 12.0 13.5 14.1   WBC 9.3 4.6 4.5     CT ABD 6/15/19  IMPRESSION:  1. Mechanical small bowel obstruction due to an incarcerated small  bowel loop in a left internal hernia. Recommend surgical consultation.  2. Small low-dense area in the pancreatic body which may be some focal  duct dilatation or cystic change. This is not an acute finding.  Recommend follow-up outpatient MRCP.     Terrell Young PA-C

## 2019-06-15 NOTE — H&P
[facility]  [facility] - H&P     Danielle Marmolejo MRN# [id number]   Age: 73 year old YOB: 1945     HPI:  This is a 73-year-old patient who is approximately 36 hours out from a colonoscopy.  About 24 hours following that, the patient developed pain and bulging in the left groin and was unable to pass gas.  She had some gurgling in the abdomen and has had progression of abdominal pain.  This felt like contractions.  She has not vomited.  Her only previous abdominal surgery was a laparoscopy for an ovarian issue in .  The patient was seen in the emergency room where a CT scan was performed.  This revealed a left groin hernia with a loop of bowel causing obstruction.  An attempt was made to reduce this in the emergency room, but this was not successful.    History is obtained from the patient    Review Of Systems:  Respiratory: No shortness of breath, dyspnea on exertion, cough, or hemoptysis  Cardiovascular: negative  Gastrointestinal: as above  Genitourinary: negative  All remaining review of systems negative except as stated in HPI.    PMH:  Past Medical History:   Diagnosis Date     Acne      Dermatitis    Polymyalgia rheumatica    PSH:  Past Surgical History:   Procedure Laterality Date     SURGICAL HISTORY OF -       lap done for polyp blocking ovaries-not cancerous or precancerous       Allergies:  Allergies   Allergen Reactions     Sulfa Drugs      rash       Home Medications:  Meloxicam  Cranberry    Social History:  Social History     Tobacco Use     Smoking status: Former Smoker     Types: Cigarettes     Last attempt to quit: 1972     Years since quittin.4     Smokeless tobacco: Never Used   Substance Use Topics     Alcohol use: Yes     Comment: caually     Drug use: No       Family History:  Family History   Problem Relation Age of Onset     Cancer Mother         cerivcal cancer-diagnosed at age of 80     Family History Negative Father         RA      Breast Cancer Sister 64        1-having issues iwth infection of brest, not cancerous right now     Family History Negative Brother         1     Family History Negative Maternal Grandmother      Diabetes Maternal Grandmother      Family History Negative Maternal Grandfather      Family History Negative Paternal Grandmother      Family History Negative Paternal Grandfather      Heart Disease No family hx of      Lipids No family hx of      Cancer - colorectal No family hx of         Physical Exam:  /78   Pulse 100   Temp 97.5  F (36.4  C) (Temporal)   Resp 16   SpO2 100%     General appearance: healthy, alert and mild distress.  Hydration: well hydrated  HEENT: normocephalic, atraumatic  Neck: no adenopathy  Lungs: normal and clear to auscultation  Heart: regular rate and rhythm and no murmurs, clicks, or gallops  Abdomen: flat, normal bowel sounds and hyperactive bowel sounds.generally nontender.  The left groin reveals swelling in the femoral canal consistent with an incarcerated femoral hernia.    Skin: clear without rashes/lesions  Extremities: no gross deformities  Neuro: oriented to time & place, moves all extremities with normal strength, speech clear    Labs Reviewed:  Lab Results   Component Value Date    WBC 9.3 06/15/2019     Lab Results   Component Value Date    HGB 12.0 06/15/2019     Lab Results   Component Value Date     06/15/2019       Last Basic Metabolic Panel:  Lab Results   Component Value Date     06/15/2019      Lab Results   Component Value Date    POTASSIUM 3.8 06/15/2019     Lab Results   Component Value Date    CHLORIDE 106 06/15/2019     Lab Results   Component Value Date    AMBER 9.4 06/15/2019     Lab Results   Component Value Date    CO2 29 06/15/2019     Lab Results   Component Value Date    BUN 23 06/15/2019     Lab Results   Component Value Date    CR 0.72 06/15/2019     Lab Results   Component Value Date     06/15/2019         Radiology:  CT abdomen  reveals a left femoral hernia with an incarcerated loop of small bowel causing obstruction.  All imaging studies reviewed by me.  A question of a pancreatic cystic lesion is noted on the CT scan.  The patient was informed that this may require follow-up.    ASSESSMENT/PLAN:  The patient's history, physical exam, laboratory and imaging studies are consistent with an incarcerated left femoral hernia with obstruction.  I have recommended diagnostic laparoscopy to reduce the hernia with possible bowel resection should there be any significant ischemic change.  I would anticipate a second surgery for definitive treatment of the hernia in the near future.  The risks, benefits, and alternatives have been discussed in detail.  All of the patient's questions have been answered.  They elect to proceed and we will go to the OR at the soonest availability.  Pre-operative antibiotics have been given in the emergency room.    Fabio Samuels MD

## 2019-06-16 LAB — GLUCOSE BLDC GLUCOMTR-MCNC: 89 MG/DL (ref 70–99)

## 2019-06-16 PROCEDURE — 12000013 ZZH R&B PEDS

## 2019-06-16 PROCEDURE — 00000146 ZZHCL STATISTIC GLUCOSE BY METER IP

## 2019-06-16 PROCEDURE — 25000132 ZZH RX MED GY IP 250 OP 250 PS 637: Performed by: PHYSICIAN ASSISTANT

## 2019-06-16 PROCEDURE — 25000132 ZZH RX MED GY IP 250 OP 250 PS 637: Performed by: SURGERY

## 2019-06-16 PROCEDURE — 99207 ZZC CONSULT E&M CHANGED TO INITIAL LEVEL: CPT | Performed by: INTERNAL MEDICINE

## 2019-06-16 PROCEDURE — 99221 1ST HOSP IP/OBS SF/LOW 40: CPT | Performed by: INTERNAL MEDICINE

## 2019-06-16 PROCEDURE — 25000128 H RX IP 250 OP 636

## 2019-06-16 RX ORDER — HYDRALAZINE HYDROCHLORIDE 20 MG/ML
10 INJECTION INTRAMUSCULAR; INTRAVENOUS EVERY 4 HOURS PRN
Status: DISCONTINUED | OUTPATIENT
Start: 2019-06-16 | End: 2019-06-17 | Stop reason: HOSPADM

## 2019-06-16 RX ORDER — AMOXICILLIN 250 MG
1 CAPSULE ORAL AT BEDTIME
Status: DISCONTINUED | OUTPATIENT
Start: 2019-06-16 | End: 2019-06-17

## 2019-06-16 RX ORDER — HYDRALAZINE HYDROCHLORIDE 20 MG/ML
INJECTION INTRAMUSCULAR; INTRAVENOUS
Status: COMPLETED
Start: 2019-06-16 | End: 2019-06-16

## 2019-06-16 RX ADMIN — ACETAMINOPHEN 975 MG: 325 TABLET, FILM COATED ORAL at 11:07

## 2019-06-16 RX ADMIN — HYDRALAZINE HYDROCHLORIDE 10 MG: 20 INJECTION INTRAMUSCULAR; INTRAVENOUS at 14:25

## 2019-06-16 RX ADMIN — ACETAMINOPHEN 975 MG: 325 TABLET, FILM COATED ORAL at 03:13

## 2019-06-16 RX ADMIN — SENNOSIDES AND DOCUSATE SODIUM 1 TABLET: 8.6; 5 TABLET ORAL at 12:02

## 2019-06-16 RX ADMIN — ACETAMINOPHEN 975 MG: 325 TABLET, FILM COATED ORAL at 18:38

## 2019-06-16 NOTE — PLAN OF CARE
Vital signs: Hypertensive; B/P: 173/74, Temp: 98, HR: 84, RR: 18  Pain Control: Scheduled Tylenol. Ice packs for comfort.  Diet: Tolerating low fiber diet without nausea or pain.  Output: Voiding adequately. Passing flatus. No stool.  Activity: Up independently in room and perdomo.  Updates: Incision clean,dry and intact. BS active and advancing diet as tolerated. Hydralazine given x1 for hypertension.  Plan: Continue to monitor and assess VS, diet, and output.

## 2019-06-16 NOTE — PROGRESS NOTES
[facility]   General Surgery Progress Note         Assessment and Plan:   Assessment:   POD#1 s/p Procedure(s):  LAPAROSCOPY, DIAGNOSTIC, reduction of strangulated left femoral hernia causing SBO.      Plan:   -Diet: full liquids  -Increase activity as tolerated  -Pain Mgmt: tylenol prn  -Monitor bowel function  -Disposition: Home depending on recovery and return of bowel function.  -Will need femoral hernia repaired in the near future as outpatient  -Pancreatic cyst seen on CT, will need outpatient MRI         Interval History:   Resting in bed. Denies pain. Has been up walking, voiding and passing flatus. Tolerating clears just fine. Requests diet advancement.          Physical Exam:   Blood pressure 173/74, pulse 84, temperature 98  F (36.7  C), temperature source Oral, resp. rate 18, SpO2 98 %, not currently breastfeeding.    I/O last 3 completed shifts:  In: 2986.33 [P.O.:1390; I.V.:1596.33]  Out: 2150 [Urine:2150]    Abdomen: soft, ND, NT, +BS  Inc(s) - clean, dry, intact with steristrips, no erythema            Data:     Recent Labs   Lab Test 06/15/19  0001 08/26/14  1129 04/26/12  1042   HGB 12.0 13.5 14.1   WBC 9.3 4.6 4.5     CT ABD 6/15/19  IMPRESSION:  1. Mechanical small bowel obstruction due to an incarcerated small  bowel loop in a left internal hernia. Recommend surgical consultation.  2. Small low-dense area in the pancreatic body which may be some focal  duct dilatation or cystic change. This is not an acute finding.  Recommend follow-up outpatient MRCP.     Terrell Young PA-C    Doing well other than new onset of hypertension.  Hospitalist to see, possible discharge later today.  Ana Damon MD

## 2019-06-16 NOTE — CONSULTS
[facility]    Hospitalist Consultation    Date of Admission:  6/14/2019    Assessment & Plan   Danielle Marmolejo is a 73 year old female who was admitted on 6/14/2019. I was asked to see the patient for high blood pressure.    She was admitted on 6/14/2019 by general surgery team.  She underwent the surgery on early morning hours of 6/15/2019,  for a strangulate with left femoral hernia.  She underwent the surgery under general anesthesia.    Yesterday, she did not feel well overall which she described was secondary to having anesthesia effect.  She had been getting normal saline until last night.    This morning she started feeling much better.  Her oral intake has been improving and her diet is being advanced.      But this morning her blood pressure was noted to be high.  In the morning it was 170/76.  In the afternoon it was 182/87.  Patient appears to be completely asymptomatic.  She denies any chest pain/palpitations/dizziness/shortness of breath/headache.    She does not have a history of high blood pressure.  Every time she has been seen by her primary care physician her blood pressure numbers were normal.  Usually they are anywhere between 110-130.     She was recently started on meloxicam for her PMR like symptoms.  She is waiting to see a rheumatologist for this.    She denies any significant pain and her pain is being controlled with Tylenol only.    Problem:     Accelerated hypertension  - Today, her blood pressure has been persistently elevated.  She denies any obvious stress.  She denies any significant pain.  - This is likely due to the stress of her recent surgery and her being in the hospital as well as normal saline IV fluid administration..  -I am not prepared to call this essential hypertension.  -She is asymptomatic but her blood pressure numbers have been persistently elevated.  While talking to her I checked her blood pressure again, 3 times, and her systolic blood pressure or  between 180-190  -Although she is asymptomatic, it is reasonable to slowly bring her blood pressure down to around 160, over the next many hours.  -She most likely will not need long-term antihypertensive treatment.  - I think it is reasonable to monitor her in the hospital for 1 more night.  - Patient reluctantly agreed with the plan.  - I have ordered IV hydralazine 10 mg every 4 hours as needed for systolic blood pressure greater than 180.  - I have recommended that she should get a blood pressure monitor and check her numbers at home as well.  She should get a quick follow-up in the outpatient setting.  -Meanwhile I have also recommended her holding the meloxicam.  -Recommended to ambulate ad yajaira.    Strangulate hernia, status post laparoscopic surgery.  -Plan per surgical team.      Thank you for the consult.  We will follow along and provide further recommendations.    DVT Prophylaxis: Defer to primary service  Code Status: Full Code    Disposition: Expected discharge tomorrow.    Marcelo Waldrop MD    Reason for Consult   Reason for consult: New hypertension.    Primary Care Physician   Raul Oscar    Chief Complaint   High blood pressure    History is obtained from the patient    History of Present Illness   please see the detail as above.    Past Medical History    I have reviewed this patient's medical history and updated it with pertinent information if needed.   Past Medical History:   Diagnosis Date     Acne      Dermatitis    Possible polymyalgia rheumatica.    Past Surgical History   I have reviewed this patient's surgical history and updated it with pertinent information if needed.  Past Surgical History:   Procedure Laterality Date     SURGICAL HISTORY OF -   1975    lap done for polyp blocking ovaries-not cancerous or precancerous       Prior to Admission Medications   Prior to Admission Medications   Prescriptions Last Dose Informant Patient Reported? Taking?   Cranberry 250 MG TABS 6/14/2019 at  Unknown time  Yes Yes   Sig: Take 1 tablet by mouth   Multiple Vitamin (MULTIVITAMINS PO) 6/14/2019 at Unknown time  Yes Yes   Sig: Take  by mouth daily.   calcium carbonate 600 mg-vitamin D 400 units (CALTRATE) 600-400 MG-UNIT per tablet 6/14/2019 at Unknown time  Yes Yes   Sig: Take 2 tablets by mouth daily   diphenhydrAMINE (SLEEP AID, DIPHENHYDRAMINE,) 25 MG tablet 6/13/2019 at HS  Yes Yes   Sig: Take 12.5 mg by mouth nightly as needed   fish oil-omega-3 fatty acids (OMEGA 3) 1000 MG capsule 6/14/2019 at Unknown time  Yes Yes   Sig: Take 1 capsule (1 g) by mouth daily   ibuprofen (ADVIL/MOTRIN) 200 MG tablet 6/14/2019 at Unknown time  Yes Yes   Sig: Take 200-400 mg by mouth as needed   magnesium 250 MG tablet Past Month at Unknown time  Yes Yes   Sig: Take 1 tablet by mouth daily as needed (Constipation)   meloxicam (MOBIC) 15 MG tablet 6/14/2019 at Unknown time  Yes Yes   Sig: Take 15 mg by mouth daily       Facility-Administered Medications: None     Allergies   Allergies   Allergen Reactions     Sulfa Drugs      rash       Social History   I have reviewed this patient's social history and updated it with pertinent information if needed. Danielle Marmolejo  reports that she quit smoking about 47 years ago. Her smoking use included cigarettes. She has never used smokeless tobacco. She reports that she drinks alcohol. She reports that she does not use drugs.    Drinks 1 to 2 glasses of wine per night.    Family History   I have reviewed this patient's family history and updated it with pertinent information if needed.   Family History   Problem Relation Age of Onset     Cancer Mother         cerivcal cancer-diagnosed at age of 80     Family History Negative Father         RA     Breast Cancer Sister 64        1-having issues iwth infection of brest, not cancerous right now     Family History Negative Brother         1     Family History Negative Maternal Grandmother      Diabetes Maternal Grandmother      Family  History Negative Maternal Grandfather      Family History Negative Paternal Grandmother      Family History Negative Paternal Grandfather      Heart Disease No family hx of      Lipids No family hx of      Cancer - colorectal No family hx of        Review of Systems   The 10 point Review of Systems is negative other than noted in the HPI or here.     Physical Exam   Temp: 98  F (36.7  C) Temp src: Oral BP: 182/87   Heart Rate: 61 Resp: 18 SpO2: 98 % O2 Device: None (Room air) Oxygen Delivery: 2 LPM  Vital Signs with Ranges  Temp:  [98  F (36.7  C)-98.5  F (36.9  C)] 98  F (36.7  C)  Heart Rate:  [61-84] 61  Resp:  [16-18] 18  BP: (123-182)/(59-88) 182/87  SpO2:  [96 %-99 %] 98 %  0 lbs 0 oz    Constitutional: Awake, alert, cooperative, no apparent distress.  Eyes: Conjunctiva and pupils examined and normal.  HEENT: Moist mucous membranes, normal dentition.  Respiratory: Clear to auscultation bilaterally, no crackles or wheezing.  Cardiovascular: Regular rate and rhythm, normal S1 and S2, and no murmur noted.  GI: Soft, non-distended, non-tender, normal bowel sounds.  Lymph/Hematologic: No anterior cervical or supraclavicular adenopathy.  Skin: No rashes, no cyanosis, no edema.  Musculoskeletal: No joint swelling, erythema or tenderness.  Neurologic: Cranial nerves 2-12 intact, normal strength and sensation.  Psychiatric: Alert, oriented to person, place and time, no obvious anxiety or depression.    Data     Recent Labs   Lab 06/15/19  0001   WBC 9.3   HGB 12.0   MCV 92         POTASSIUM 3.8   CHLORIDE 106   CO2 29   BUN 23   CR 0.72   ANIONGAP 5   AMBER 9.4   *   ALBUMIN 3.7   PROTTOTAL 7.2   BILITOTAL 0.3   ALKPHOS 118   ALT 22   AST 22   LIPASE 279       No results found for this or any previous visit (from the past 24 hour(s)).

## 2019-06-16 NOTE — PLAN OF CARE
VSS, afebrile.  Tolerating clear liquid diet.  Abdomen soft.  Bowel sounds present.  Passing flatus.  Voiding.  Incisions C/D/I.  Pain well managed with scheduled acetaminophen.  Ambulating in halls.  Up independently.  Hoping to advance diet and discharge later today.

## 2019-06-16 NOTE — PROGRESS NOTES
Problem: Patient having hypertension. BP: 173/74, rechecked for 182/87  MD notified:Yes, surgeon.  Plan: Surgeon to place hospitalist consult for treatment of hypertension.  Family updated:Yes

## 2019-06-17 VITALS
HEART RATE: 84 BPM | RESPIRATION RATE: 16 BRPM | SYSTOLIC BLOOD PRESSURE: 144 MMHG | DIASTOLIC BLOOD PRESSURE: 62 MMHG | OXYGEN SATURATION: 97 % | TEMPERATURE: 98.2 F

## 2019-06-17 LAB — GLUCOSE BLDC GLUCOMTR-MCNC: 89 MG/DL (ref 70–99)

## 2019-06-17 PROCEDURE — 00000146 ZZHCL STATISTIC GLUCOSE BY METER IP

## 2019-06-17 PROCEDURE — 99232 SBSQ HOSP IP/OBS MODERATE 35: CPT | Performed by: INTERNAL MEDICINE

## 2019-06-17 PROCEDURE — 25000132 ZZH RX MED GY IP 250 OP 250 PS 637: Performed by: SURGERY

## 2019-06-17 PROCEDURE — 25000132 ZZH RX MED GY IP 250 OP 250 PS 637: Performed by: PHYSICIAN ASSISTANT

## 2019-06-17 RX ORDER — SENNOSIDES 8.6 MG
2 TABLET ORAL 2 TIMES DAILY
Qty: 40 TABLET | Refills: 0 | Status: SHIPPED | OUTPATIENT
Start: 2019-06-17

## 2019-06-17 RX ORDER — SENNOSIDES 8.6 MG
2 TABLET ORAL 2 TIMES DAILY
Status: DISCONTINUED | OUTPATIENT
Start: 2019-06-17 | End: 2019-06-17 | Stop reason: HOSPADM

## 2019-06-17 RX ADMIN — SENNOSIDES 2 TABLET: 8.6 TABLET, FILM COATED ORAL at 10:14

## 2019-06-17 RX ADMIN — ACETAMINOPHEN 975 MG: 325 TABLET, FILM COATED ORAL at 02:52

## 2019-06-17 RX ADMIN — ACETAMINOPHEN 975 MG: 325 TABLET, FILM COATED ORAL at 13:12

## 2019-06-17 NOTE — PLAN OF CARE
VSS, afebrile.  Blood pressure improving.  Tolerating soft, low fiber diet.  Passing gas, no stool.  Voiding, but was feeling like it was taking a long time to void.  Post-void residual checked to ensure adequate emptying.  Bladder scanned for 4ml.  Ambulating in halls independently.  Steri strips over incisions clean, dry, and intact.  Discharge eduction complete with patient.  Discharged home with .

## 2019-06-17 NOTE — PROGRESS NOTES
[facility]   General Surgery Progress Note         Assessment and Plan:   Assessment:   -Small bowel obstruction due to strangulated left femoral hernia  -POD#2 s/p Diagnostic laparoscopy with reduction of strangulated left femoral hernia; viable bowel noted and no need for resection.  -CT finding of Small low-dense area in the pancreatic body which may be some focal duct dilatation or cystic change. This is not an acute finding.  Recommend follow-up outpatient MRCP.  -h/o chronic constipation  -Elevated BP; continued      Plan:   -Diet: soft diet, small meals.    -Bowel program: senokot BID until regular BMs, then decrease dosing  -Will need femoral hernia repaired in the near future as outpatient, about 2 weeks.    -Pancreatic cyst seen on CT, will plan outpatient MRI.  -Will have  contact patient to arrange above.  -Disposition: OK to discharge from surgery standpoint.  Await clearance from Hospitalist regarding elevated BP.  PCP f/u for HTN, med adjustments per their recommendations.  -Discharge Rx: seonokot         Interval History:   Comfortable in bed with mildly bloated sensation, occasional cramp -about once a day when walking.  No nausea, no belching.  Still has appetite and passing flatus.  No BM yet.  Taking senokot once a day.          Physical Exam:   Blood pressure 168/78, pulse 84, temperature 98.2  F (36.8  C), temperature source Oral, resp. rate 16, SpO2 97 %, not currently breastfeeding.    I/O last 3 completed shifts:  In: 780 [P.O.:780]  Out: -     Abdomen: soft, ND, NT, +BS  Incs - clean, dry, intact with steristrips, no erythema            Data:     Recent Labs   Lab 06/15/19  0001   WBC 9.3   HGB 12.0   HCT 38.6   MCV 92        Recent Labs   Lab 06/15/19  0025 06/15/19  0001   NA  --  140   POTASSIUM  --  3.8   CHLORIDE  --  106   CO2  --  29   ANIONGAP  --  5   GLC  --  142*   BUN  --  23   CR  --  0.72   GFRESTIMATED 82 83   GFRESTBLACK >90 >90   AMBER  --  9.4    PROTTOTAL  --  7.2   ALBUMIN  --  3.7   BILITOTAL  --  0.3   ALKPHOS  --  118   AST  --  22   ALT  --  22     CT ABD 6/15/19  IMPRESSION:  1. Mechanical small bowel obstruction due to an incarcerated small  bowel loop in a left internal hernia. Recommend surgical consultation.  2. Small low-dense area in the pancreatic body which may be some focal duct dilatation or cystic change. This is not an acute finding.  Recommend follow-up outpatient MRCP.     Opal Vega PA-C    Seen and agree,    Ready for discharge per medicine.    Fabio Samuels MD  [facility]

## 2019-06-17 NOTE — PLAN OF CARE
Vital Signs: VSS. Highest SBP was 150 since 1900.    Pain/Comfort: denies except one episode of sharp and aching abdominal pain in RLQ. Resolved within a few minutes.  Administered scheduled tylenol  Assessment: WDL except bowel sounds hyperactive  Diet: Low fiber, tolerating well  Output: voiding and passing gas, no BM yet  Activity/Ambulation: up ad yajaira, walking frequently in halls  Plan: Will continue to monitor and provide supportive therapies a needed

## 2019-06-17 NOTE — PROGRESS NOTES
[facility]  Hospitalist Progress Note  Name: Danielle Marmolejo    MRN: [id number]  YOB: 1945    Age: 73 year old  Date of admission: 6/14/2019  Primary care provider: Raul Oscar      Reason for Stay (Diagnosis): Strangulated left femoral hernia         Assessment and Plan:      Summary of Stay:  Danielle Marmolejo is a 73 year old female who was admitted on 6/14/2019.We were asked to see the patient for high blood pressure.     She was admitted on 6/14/2019 by general surgery team.  She underwent the surgery on early morning hours of 6/15/2019,  for a strangulated left femoral hernia.  She underwent the surgery under general anesthesia but on the morning  of planned discharge, her blood pressure was noted to be high.  It was 182/87.  Patient appears to be completely asymptomatic.  She denies any chest pain/palpitations/dizziness/shortness of breath/headache.     She does not have a history of high blood pressure.  Every time she has been seen by her primary care physician her blood pressure numbers were normal.  Usually they are anywhere between 110-130.      She was recently started on meloxicam for her PMR like symptoms.  She is waiting to see a rheumatologist for this.     She denies any significant pain and her pain is being controlled with Tylenol only.     Problem:      Accelerated hypertension  - Improved and better controlled.  Suspect due to consequence of postoperative stress, pain, lack of activity, and dietary changes while in the hospital.  She denies any obvious stress.  Do not suspect that she will require chronic antihypertensives but did encourage her to check her blood pressure on a routine basis and follow-up with her primary MD.  -She most likely will not need long-term antihypertensive treatment.  - I have recommended that she should get a blood pressure monitor and check her numbers at home as well.  She should get a quick follow-up in the  "outpatient setting.  -Recommended to ambulate ad yajaira.     Strangulated hernia, status post laparoscopic surgery.  -Plan per surgical team.        DVT Prophylaxis: Low Risk/Ambulatory with no VTE prophylaxis indicated  Code Status: Full Code  Discharge Dispo: Home  Estimated Disch Date / # of Days until Disch: Okay to discharge from medicine's perspective        Interval History (Subjective):      Pain controlled with just Tylenol alone.  Anxious for discharge       Physical Exam:      Vital signs:  Temp: 98.2  F (36.8  C) Temp src: Oral BP: 144/62   Heart Rate: 87 Resp: 16 SpO2: 97 % O2 Device: None (Room air) Oxygen Delivery: 2 LPM      Estimated body mass index is 22.15 kg/m  as calculated from the following:    Height as of 6/9/15: 1.702 m (5' 7\").    Weight as of 6/9/15: 64.2 kg (141 lb 7 oz).    I/O last 3 completed shifts:  In: 780 [P.O.:780]  Out: -   There were no vitals filed for this visit.    Constitutional: Awake, alert, cooperative, no apparent distress   Respiratory: Nl work of breathing. Clear to auscultation bilaterally, no crackles or wheezing   Cardiovascular: Regular rate and rhythm, normal S1 and S2, and no murmur noted   Abdomen: Normal bowel sounds, soft, non-distended, non-tender   Skin: No rashes, no cyanosis, dry to touch   Neuro: CN 2-12 intact, no localizing weakness   Extremities: No edema, normal range of motion   HEENT Normocephalic, atraumatic, normal nasal turbinates; oropharynx clear   Neck Supple; nl inspection; trachea midline; no thryomegaly   Psychiatric: A+O x3. Normal affect          Medications:      All current medications were reviewed with changes reflected in problem list.         Data:      All new lab and imaging data was reviewed.   Labs:  Recent Labs   Lab 06/15/19  0001   WBC 9.3   HGB 12.0   HCT 38.6   MCV 92        Recent Labs   Lab 06/15/19  0025 06/15/19  0001   NA  --  140   POTASSIUM  --  3.8   CHLORIDE  --  106   CO2  --  29   ANIONGAP  --  5   GLC  --  " 142*   BUN  --  23   CR  --  0.72   GFRESTIMATED 82 83   GFRESTBLACK >90 >90   AMBER  --  9.4   PROTTOTAL  --  7.2   ALBUMIN  --  3.7   BILITOTAL  --  0.3   ALKPHOS  --  118   AST  --  22   ALT  --  22      Imaging:   No results found for this or any previous visit (from the past 24 hour(s)).    Manuel Franco -[phone]

## 2019-06-21 ENCOUNTER — TELEPHONE (OUTPATIENT)
Dept: SURGERY | Facility: CLINIC | Age: 74
End: 2019-06-21

## 2019-06-21 DIAGNOSIS — K86.2 PANCREATIC CYST: Primary | ICD-10-CM

## 2019-06-21 NOTE — TELEPHONE ENCOUNTER
[facility]     Date: 6-27-19  Time: 10:00 AM   Location: [facility] [address] [place]  [address]        Please check in at 9:30 AM     Nothing to eat or drink 6 hrs before

## 2019-06-21 NOTE — TELEPHONE ENCOUNTER
Type of surgery: ROBOTIC ASSISTED LEFT FEMORAL HERNIA REPAIR WITH MESH   Location of surgery: [facility]  Date and time of surgery: 7-2-19, 3:20 PM   Surgeon: DR. JUNIOR   Pre-Op Appt Date: PATIENT TO SCHEDULE   Post-Op Appt Date: PATIENT TO SCHEDULE    Packet sent out: Yes  Pre-cert/Authorization completed:  Not Applicable  Date: 6-21-19      ROBOTIC ASSISTED LEFT FEMORAL HERNIA REPAIR WITH MESH   GENERAL   PT INST TO HAVE H&P WITH DR. KHALIL  75 MINS REQ   PA ASSIST [facility]   ALW

## 2019-06-24 NOTE — DISCHARGE SUMMARY
[facility]    Discharge Summary  Surgery    Date of Admission:  6/14/2019  Date of Discharge:  6/17/2019  1:26 PM  Discharging Provider: Opal Vega PA-C  Discharge Summary Note completed by: Sarah Brantley PA-C on 6/24/2019  Date of Service: The patient was personally seen by Discharging Providers on the day of discharge.    Discharge Diagnoses   Active Problems:    Small bowel obstruction (H)      Procedure/Surgery Information   Procedure(s):  LAPAROSCOPY, DIAGNOSTIC, reduction of strangulated left femoral hernia   Surgeon(s) and Role:     * Fabio Samuels MD - Primary     Specimens: * No specimens in log *   Non-operative procedures: None performed     Intraoperative findings:  Approximately 8 cm of small bowel strangulated within a left femoral hernia.  This was reduced laparoscopically and the bowel was watched for approximately 20 minutes.  It gradually began to show signs of perfusion.  It was felt that this would likely recover without incident, and therefore was not resected.  As discussed prior to surgery, the patient will require repair of her femoral hernia in the near future using a robotic technique.    History of Present Illness   aDnielle Marmolejo is a 73 year old female who presented a day after a colonoscopy with painful swelling in the left groin.  CT scan showed an incarcerated left femoral hernia.  This was not reducible and laparoscopy for reduction of the hernia was therefore recommended to the patient.    Hospital Course   Danielle Marmolejo was admitted on 6/14/2019.  The following problems were addressed during her hospitalization:  Patient Active Problem List   Diagnosis     Dermatitis     Acne     Elevated blood pressure reading without diagnosis of hypertension     CARDIOVASCULAR SCREENING; LDL GOAL LESS THAN 160     Joint pain     Impaired fasting glucose     Osteopenia     Advanced directives, counseling/discussion     Serrated adenoma of colon     Family  history of breast cancer     Colonic polyp     Small bowel obstruction (H)       Jaimee-operative antibiotic therapy included: none.  Post-operative pain control: was via IV until able to tolerate PO intake and transitioned to PO pain meds.    Remarkable hospital course events: ileus as expected.   Hospitalist service was consulted for management of new hypertension.  Please see Hospitalist notes for further details if needed.    Danielle met all criteria for release on 6/17/2019  1:26 PM.  She was afebrile, tolerating diet, pain controlled on PO meds, ambulating well, and had return of bowel function.    Medications discontinued or adjusted during this hospitalization: see discharge med list below.    Antibiotics prescribed at discharge: None prescribed     Imaging study follow up needs:   -outpatient MRI to follow up on pancreatic cyst seen on CT    Discharge Instructions and Follow-Up:  Discharge diet: Regular   Discharge activity: Lifting restricted to 20 pounds   Discharge follow-up: Follow up with Dr. Samuels in 2-3 weeks to discuss scheduling of femoral hernia repair.   Wound/Incision care: May get incision wet in shower but do not soak or scrub       Sarah Brantley PA-C      Discharge Disposition   Discharged to home   Condition at discharge: Good    Pending Results   Final pathology results: No pathology submitted  Unresulted Labs Ordered in the Past 30 Days of this Admission     No orders found from 4/15/2019 to 6/15/2019.          Primary Care Physician   Raul Oscar    Consultations This Hospital Stay   HOSPITALIST IP CONSULT    Discharge Orders      Activity    Your activity upon discharge: activity as tolerated     Follow-up and recommended labs and tests     Follow up with general surgery within 1 week or as scheduled.     Full Code     Diet    Follow this diet upon discharge: Orders Placed This Encounter      Mechanical/Dental Soft Diet for 3-5 days, the advance as tolerated to regular diet.      Discharge Medications   Discharge Medication List as of 6/17/2019 12:48 PM      START taking these medications    Details   sennosides (SENOKOT) 8.6 MG tablet Take 2 tablets by mouth 2 times daily, Disp-40 tablet, R-0, E-Prescribe         CONTINUE these medications which have NOT CHANGED    Details   calcium carbonate 600 mg-vitamin D 400 units (CALTRATE) 600-400 MG-UNIT per tablet Take 2 tablets by mouth daily, Historical      Cranberry 250 MG TABS Take 1 tablet by mouth, Historical      diphenhydrAMINE (SLEEP AID, DIPHENHYDRAMINE,) 25 MG tablet Take 12.5 mg by mouth nightly as needed, Historical      fish oil-omega-3 fatty acids (OMEGA 3) 1000 MG capsule Take 1 capsule (1 g) by mouth daily, 1 g, Oral, DAILY Starting 8/8/2013, Until Discontinued, Disp-90 capsule, R-3, Historical      ibuprofen (ADVIL/MOTRIN) 200 MG tablet Take 200-400 mg by mouth as needed, Historical      magnesium 250 MG tablet Take 1 tablet by mouth daily as needed (Constipation), Historical      meloxicam (MOBIC) 15 MG tablet Take 15 mg by mouth daily , Historical      Multiple Vitamin (MULTIVITAMINS PO) Take  by mouth daily., Oral, DAILY, Until Discontinued, Historical           Allergies   Allergies   Allergen Reactions     Sulfa Drugs      rash     Data   Most Recent 3 CBC's:  Recent Labs   Lab Test 06/15/19  0001 08/26/14  1129 04/26/12  1042   WBC 9.3 4.6 4.5   HGB 12.0 13.5 14.1   MCV 92 94 91    192 188      Most Recent 3 BMP's:  Recent Labs   Lab Test 06/15/19  0001 08/26/14  1129 04/26/12  1042    137 139   POTASSIUM 3.8 4.0 4.6   CHLORIDE 106 103 100   CO2 29 28 28   BUN 23 16 18   CR 0.72 0.77 0.67   ANIONGAP 5 6 11   AMBER 9.4 9.7 10.3   * 94 92     Most Recent 2 LFT's:  Recent Labs   Lab Test 06/15/19  0001 08/26/14  1129   AST 22 18   ALT 22 23   ALKPHOS 118 91   BILITOTAL 0.3 1.1     Most Recent INR's and Anticoagulation Dosing History:  Anticoagulation Dose History     There is no flowsheet data to display.         Most Recent 3 Troponin's:No lab results found.  Most Recent Cholesterol Panel:  Recent Labs   Lab Test 08/26/14  1129   CHOL 145   LDL 48   HDL 80   TRIG 87     Most Recent 6 Bacteria Isolates From Any Culture (See EPIC Reports for Culture Details):  Recent Labs   Lab Test 05/14/15  1550   CULT >100,000 colonies/mL Beta hemolytic Streptococcus group B Beta Hemolytic   Streptococcus groups A and B are susceptible to ampicillin, penicillin,   vancomycin, and the cephalosporins.  Susceptibility testing is not routinely   done on these organisms isolated from urine.  *     Most Recent TSH, T4 and A1c Labs:  Recent Labs   Lab Test 04/26/12  1042   TSH 0.51     Results for orders placed or performed during the hospital encounter of 06/14/19   CT Abdomen Pelvis w Contrast    Narrative    CT ABDOMEN/PELVIS WITH CONTRAST  6/15/2019 12:59 AM     HISTORY: Abdominal pain post colonoscopy.    TECHNIQUE: Volumetric acquisition through abdomen and pelvis with  IV  contrast,  71 mL Isovue-370. Radiation dose for this scan was reduced  using automated exposure control, adjustment of the mA and/or kV  according to patient size, or iterative reconstruction technique.    COMPARISON: None.    FINDINGS: The liver, gallbladder, spleen, pancreas, adrenal glands and  kidneys demonstrate no worrisome findings.  There is a tiny  hypodensity in the spleen which is of doubtful clinical significance.  Low-attenuation subcentimeter renal lesion(s). These are compatible  with small benign cysts and no specific imaging evaluation or  follow-up is recommended. Small low-density area in the pancreatic  body is somewhat linear and may represent some focal pancreatic ductal  prominence and/or cystic change.    Several mildly dilated, fluid-filled small bowel loops in the lower  abdomen and pelvis with air-fluid levels. A fluid-density mass in the  left inguinal region appears to be a localized fluid-filled bowel loop  extending into a left  internal hernia through a tight hernia neck.  This appears to be the likely cause of small bowel obstruction.    Uterus is present. Colonic diverticula without diverticulitis. No free  air or other acute findings.      Impression    IMPRESSION:  1. Mechanical small bowel obstruction due to an incarcerated small  bowel loop in a left internal hernia. Recommend surgical consultation.  2. Small low-dense area in the pancreatic body which may be some focal  duct dilatation or cystic change. This is not an acute finding.  Recommend follow-up outpatient MRCP.    STEPHEN LYNN MD

## 2019-06-27 ENCOUNTER — HOSPITAL ENCOUNTER (OUTPATIENT)
Dept: MRI IMAGING | Facility: CLINIC | Age: 74
Discharge: HOME | End: 2019-06-27
Attending: SURGERY | Admitting: SURGERY
Payer: COMMERCIAL

## 2019-06-27 DIAGNOSIS — K86.2 PANCREATIC CYST: ICD-10-CM

## 2019-06-27 PROCEDURE — 25500064 ZZH RX 255 OP 636: Performed by: SURGERY

## 2019-06-27 PROCEDURE — A9585 GADOBUTROL INJECTION: HCPCS | Performed by: SURGERY

## 2019-06-27 PROCEDURE — 74183 MRI ABD W/O CNTR FLWD CNTR: CPT

## 2019-06-27 RX ORDER — GADOBUTROL 604.72 MG/ML
7.5 INJECTION INTRAVENOUS ONCE
Status: COMPLETED | OUTPATIENT
Start: 2019-06-27 | End: 2019-06-27

## 2019-06-27 RX ADMIN — GADOBUTROL 6 ML: 604.72 INJECTION INTRAVENOUS at 11:08

## 2019-07-01 ASSESSMENT — MIFFLIN-ST. JEOR: SCORE: 1126.84

## 2019-07-02 ENCOUNTER — HOSPITAL ENCOUNTER (OUTPATIENT)
Facility: CLINIC | Age: 74
Discharge: HOME | End: 2019-07-02
Attending: SURGERY | Admitting: SURGERY
Payer: COMMERCIAL

## 2019-07-02 ENCOUNTER — APPOINTMENT (OUTPATIENT)
Dept: SURGERY | Facility: PHYSICIAN GROUP | Age: 74
End: 2019-07-02
Payer: COMMERCIAL

## 2019-07-02 ENCOUNTER — ANESTHESIA EVENT (OUTPATIENT)
Dept: SURGERY | Facility: CLINIC | Age: 74
End: 2019-07-02
Payer: COMMERCIAL

## 2019-07-02 ENCOUNTER — ANESTHESIA (OUTPATIENT)
Dept: SURGERY | Facility: CLINIC | Age: 74
End: 2019-07-02
Payer: COMMERCIAL

## 2019-07-02 VITALS
BODY MASS INDEX: 20.09 KG/M2 | RESPIRATION RATE: 14 BRPM | WEIGHT: 128 LBS | SYSTOLIC BLOOD PRESSURE: 126 MMHG | HEIGHT: 67 IN | OXYGEN SATURATION: 97 % | TEMPERATURE: 97.7 F | HEART RATE: 66 BPM | DIASTOLIC BLOOD PRESSURE: 71 MMHG

## 2019-07-02 DIAGNOSIS — K41.30 FEMORAL HERNIA OF LEFT SIDE WITH OBSTRUCTION: Primary | ICD-10-CM

## 2019-07-02 PROCEDURE — 27210794 ZZH OR GENERAL SUPPLY STERILE: Performed by: SURGERY

## 2019-07-02 PROCEDURE — 37000009 ZZH ANESTHESIA TECHNICAL FEE, EACH ADDTL 15 MIN: Performed by: SURGERY

## 2019-07-02 PROCEDURE — 25000128 H RX IP 250 OP 636: Performed by: NURSE ANESTHETIST, CERTIFIED REGISTERED

## 2019-07-02 PROCEDURE — S2900 ROBOTIC SURGICAL SYSTEM: HCPCS | Mod: AS | Performed by: PHYSICIAN ASSISTANT

## 2019-07-02 PROCEDURE — 25800030 ZZH RX IP 258 OP 636: Performed by: NURSE ANESTHETIST, CERTIFIED REGISTERED

## 2019-07-02 PROCEDURE — 71000012 ZZH RECOVERY PHASE 1 LEVEL 1 FIRST HR: Performed by: SURGERY

## 2019-07-02 PROCEDURE — 49659 UNLSTD LAPS PX HRNAP HRNRPHY: CPT | Performed by: SURGERY

## 2019-07-02 PROCEDURE — 49659 UNLSTD LAPS PX HRNAP HRNRPHY: CPT | Mod: AS | Performed by: PHYSICIAN ASSISTANT

## 2019-07-02 PROCEDURE — 25000125 ZZHC RX 250: Performed by: SURGERY

## 2019-07-02 PROCEDURE — 25000132 ZZH RX MED GY IP 250 OP 250 PS 637: Performed by: SURGERY

## 2019-07-02 PROCEDURE — 25000125 ZZHC RX 250: Performed by: NURSE ANESTHETIST, CERTIFIED REGISTERED

## 2019-07-02 PROCEDURE — C1781 MESH (IMPLANTABLE): HCPCS | Performed by: SURGERY

## 2019-07-02 PROCEDURE — 36000087 ZZH SURGERY LEVEL 8 EA 15 ADDTL MIN: Performed by: SURGERY

## 2019-07-02 PROCEDURE — 71000027 ZZH RECOVERY PHASE 2 EACH 15 MINS: Performed by: SURGERY

## 2019-07-02 PROCEDURE — 25000128 H RX IP 250 OP 636: Performed by: SURGERY

## 2019-07-02 PROCEDURE — S2900 ROBOTIC SURGICAL SYSTEM: HCPCS | Performed by: SURGERY

## 2019-07-02 PROCEDURE — 25000128 H RX IP 250 OP 636: Performed by: ANESTHESIOLOGY

## 2019-07-02 PROCEDURE — 37000008 ZZH ANESTHESIA TECHNICAL FEE, 1ST 30 MIN: Performed by: SURGERY

## 2019-07-02 PROCEDURE — 36000085 ZZH SURGERY LEVEL 8 1ST 30 MIN: Performed by: SURGERY

## 2019-07-02 PROCEDURE — 40000305 ZZH STATISTIC PRE PROC ASSESS I: Performed by: SURGERY

## 2019-07-02 DEVICE — MESH PROGRIP LAPAROSCOPIC 5.9X3.9" PARIETEX SELF-FIX LPG1510: Type: IMPLANTABLE DEVICE | Site: INGUINAL | Status: FUNCTIONAL

## 2019-07-02 RX ORDER — HYDROMORPHONE HYDROCHLORIDE 1 MG/ML
.3-.5 INJECTION, SOLUTION INTRAMUSCULAR; INTRAVENOUS; SUBCUTANEOUS EVERY 10 MIN PRN
Status: DISCONTINUED | OUTPATIENT
Start: 2019-07-02 | End: 2019-07-02 | Stop reason: HOSPADM

## 2019-07-02 RX ORDER — MEPERIDINE HYDROCHLORIDE 50 MG/ML
12.5 INJECTION INTRAMUSCULAR; INTRAVENOUS; SUBCUTANEOUS
Status: DISCONTINUED | OUTPATIENT
Start: 2019-07-02 | End: 2019-07-02 | Stop reason: HOSPADM

## 2019-07-02 RX ORDER — LIDOCAINE HYDROCHLORIDE 10 MG/ML
INJECTION, SOLUTION INFILTRATION; PERINEURAL PRN
Status: DISCONTINUED | OUTPATIENT
Start: 2019-07-02 | End: 2019-07-02

## 2019-07-02 RX ORDER — ALBUTEROL SULFATE 0.83 MG/ML
2.5 SOLUTION RESPIRATORY (INHALATION) EVERY 4 HOURS PRN
Status: DISCONTINUED | OUTPATIENT
Start: 2019-07-02 | End: 2019-07-02 | Stop reason: HOSPADM

## 2019-07-02 RX ORDER — DEXAMETHASONE SODIUM PHOSPHATE 4 MG/ML
INJECTION, SOLUTION INTRA-ARTICULAR; INTRALESIONAL; INTRAMUSCULAR; INTRAVENOUS; SOFT TISSUE PRN
Status: DISCONTINUED | OUTPATIENT
Start: 2019-07-02 | End: 2019-07-02

## 2019-07-02 RX ORDER — ONDANSETRON 2 MG/ML
INJECTION INTRAMUSCULAR; INTRAVENOUS PRN
Status: DISCONTINUED | OUTPATIENT
Start: 2019-07-02 | End: 2019-07-02

## 2019-07-02 RX ORDER — TAMSULOSIN HYDROCHLORIDE 0.4 MG/1
0.4 CAPSULE ORAL
Status: DISCONTINUED | OUTPATIENT
Start: 2019-07-02 | End: 2019-07-02 | Stop reason: HOSPADM

## 2019-07-02 RX ORDER — SODIUM CHLORIDE, SODIUM LACTATE, POTASSIUM CHLORIDE, CALCIUM CHLORIDE 600; 310; 30; 20 MG/100ML; MG/100ML; MG/100ML; MG/100ML
INJECTION, SOLUTION INTRAVENOUS CONTINUOUS
Status: DISCONTINUED | OUTPATIENT
Start: 2019-07-02 | End: 2019-07-02 | Stop reason: HOSPADM

## 2019-07-02 RX ORDER — FENTANYL CITRATE 50 UG/ML
25-50 INJECTION, SOLUTION INTRAMUSCULAR; INTRAVENOUS
Status: DISCONTINUED | OUTPATIENT
Start: 2019-07-02 | End: 2019-07-02 | Stop reason: HOSPADM

## 2019-07-02 RX ORDER — LABETALOL 20 MG/4 ML (5 MG/ML) INTRAVENOUS SYRINGE
10 EVERY 5 MIN PRN
Status: DISCONTINUED | OUTPATIENT
Start: 2019-07-02 | End: 2019-07-02 | Stop reason: HOSPADM

## 2019-07-02 RX ORDER — ONDANSETRON 2 MG/ML
4 INJECTION INTRAMUSCULAR; INTRAVENOUS EVERY 30 MIN PRN
Status: DISCONTINUED | OUTPATIENT
Start: 2019-07-02 | End: 2019-07-02 | Stop reason: HOSPADM

## 2019-07-02 RX ORDER — FENTANYL CITRATE 50 UG/ML
25-50 INJECTION, SOLUTION INTRAMUSCULAR; INTRAVENOUS
Status: CANCELLED | OUTPATIENT
Start: 2019-07-02

## 2019-07-02 RX ORDER — LABETALOL 20 MG/4 ML (5 MG/ML) INTRAVENOUS SYRINGE
PRN
Status: DISCONTINUED | OUTPATIENT
Start: 2019-07-02 | End: 2019-07-02

## 2019-07-02 RX ORDER — OXYCODONE HYDROCHLORIDE 5 MG/1
5 TABLET ORAL
Status: COMPLETED | OUTPATIENT
Start: 2019-07-02 | End: 2019-07-02

## 2019-07-02 RX ORDER — ONDANSETRON 4 MG/1
4 TABLET, ORALLY DISINTEGRATING ORAL EVERY 30 MIN PRN
Status: DISCONTINUED | OUTPATIENT
Start: 2019-07-02 | End: 2019-07-02 | Stop reason: HOSPADM

## 2019-07-02 RX ORDER — CEFAZOLIN SODIUM 2 G/100ML
2 INJECTION, SOLUTION INTRAVENOUS
Status: COMPLETED | OUTPATIENT
Start: 2019-07-02 | End: 2019-07-02

## 2019-07-02 RX ORDER — CEFAZOLIN SODIUM 1 G/3ML
1 INJECTION, POWDER, FOR SOLUTION INTRAMUSCULAR; INTRAVENOUS SEE ADMIN INSTRUCTIONS
Status: DISCONTINUED | OUTPATIENT
Start: 2019-07-02 | End: 2019-07-02 | Stop reason: HOSPADM

## 2019-07-02 RX ORDER — PROPOFOL 10 MG/ML
INJECTION, EMULSION INTRAVENOUS PRN
Status: DISCONTINUED | OUTPATIENT
Start: 2019-07-02 | End: 2019-07-02

## 2019-07-02 RX ORDER — FENTANYL CITRATE 50 UG/ML
INJECTION, SOLUTION INTRAMUSCULAR; INTRAVENOUS PRN
Status: DISCONTINUED | OUTPATIENT
Start: 2019-07-02 | End: 2019-07-02

## 2019-07-02 RX ORDER — OXYCODONE HYDROCHLORIDE 5 MG/1
5-10 TABLET ORAL EVERY 4 HOURS PRN
Qty: 12 TABLET | Refills: 0 | Status: SHIPPED | OUTPATIENT
Start: 2019-07-02

## 2019-07-02 RX ORDER — BUPIVACAINE HYDROCHLORIDE AND EPINEPHRINE 5; 5 MG/ML; UG/ML
INJECTION, SOLUTION EPIDURAL; INTRACAUDAL; PERINEURAL PRN
Status: DISCONTINUED | OUTPATIENT
Start: 2019-07-02 | End: 2019-07-02 | Stop reason: HOSPADM

## 2019-07-02 RX ORDER — SODIUM CHLORIDE, SODIUM LACTATE, POTASSIUM CHLORIDE, CALCIUM CHLORIDE 600; 310; 30; 20 MG/100ML; MG/100ML; MG/100ML; MG/100ML
INJECTION, SOLUTION INTRAVENOUS CONTINUOUS PRN
Status: DISCONTINUED | OUTPATIENT
Start: 2019-07-02 | End: 2019-07-02

## 2019-07-02 RX ORDER — NALOXONE HYDROCHLORIDE 0.4 MG/ML
.1-.4 INJECTION, SOLUTION INTRAMUSCULAR; INTRAVENOUS; SUBCUTANEOUS
Status: DISCONTINUED | OUTPATIENT
Start: 2019-07-02 | End: 2019-07-02 | Stop reason: HOSPADM

## 2019-07-02 RX ADMIN — FENTANYL CITRATE 50 MCG: 50 INJECTION, SOLUTION INTRAMUSCULAR; INTRAVENOUS at 15:09

## 2019-07-02 RX ADMIN — FENTANYL CITRATE 50 MCG: 50 INJECTION INTRAMUSCULAR; INTRAVENOUS at 16:27

## 2019-07-02 RX ADMIN — LIDOCAINE HYDROCHLORIDE 50 MG: 10 INJECTION, SOLUTION INFILTRATION; PERINEURAL at 14:37

## 2019-07-02 RX ADMIN — CEFAZOLIN SODIUM 2 G: 2 INJECTION, SOLUTION INTRAVENOUS at 14:27

## 2019-07-02 RX ADMIN — HYDROMORPHONE HYDROCHLORIDE 0.2 MG: 1 INJECTION, SOLUTION INTRAMUSCULAR; INTRAVENOUS; SUBCUTANEOUS at 16:25

## 2019-07-02 RX ADMIN — PROPOFOL 140 MG: 10 INJECTION, EMULSION INTRAVENOUS at 14:37

## 2019-07-02 RX ADMIN — DEXAMETHASONE SODIUM PHOSPHATE 8 MG: 4 INJECTION, SOLUTION INTRA-ARTICULAR; INTRALESIONAL; INTRAMUSCULAR; INTRAVENOUS; SOFT TISSUE at 15:11

## 2019-07-02 RX ADMIN — FENTANYL CITRATE 50 MCG: 50 INJECTION, SOLUTION INTRAMUSCULAR; INTRAVENOUS at 14:26

## 2019-07-02 RX ADMIN — ONDANSETRON 4 MG: 2 INJECTION INTRAMUSCULAR; INTRAVENOUS at 15:52

## 2019-07-02 RX ADMIN — OXYCODONE HYDROCHLORIDE 5 MG: 5 TABLET ORAL at 16:20

## 2019-07-02 RX ADMIN — PROPOFOL 50 MG: 10 INJECTION, EMULSION INTRAVENOUS at 15:08

## 2019-07-02 RX ADMIN — LABETALOL 20 MG/4 ML (5 MG/ML) INTRAVENOUS SYRINGE 10 MG: at 15:54

## 2019-07-02 RX ADMIN — FENTANYL CITRATE 50 MCG: 50 INJECTION, SOLUTION INTRAMUSCULAR; INTRAVENOUS at 14:46

## 2019-07-02 RX ADMIN — HYDROMORPHONE HYDROCHLORIDE 0.3 MG: 1 INJECTION, SOLUTION INTRAMUSCULAR; INTRAVENOUS; SUBCUTANEOUS at 16:19

## 2019-07-02 RX ADMIN — LABETALOL 20 MG/4 ML (5 MG/ML) INTRAVENOUS SYRINGE 10 MG: at 16:02

## 2019-07-02 RX ADMIN — FENTANYL CITRATE 50 MCG: 50 INJECTION INTRAMUSCULAR; INTRAVENOUS at 16:17

## 2019-07-02 RX ADMIN — ROCURONIUM BROMIDE 50 MG: 10 INJECTION INTRAVENOUS at 14:38

## 2019-07-02 RX ADMIN — SODIUM CHLORIDE, POTASSIUM CHLORIDE, SODIUM LACTATE AND CALCIUM CHLORIDE: 600; 310; 30; 20 INJECTION, SOLUTION INTRAVENOUS at 14:06

## 2019-07-02 RX ADMIN — SODIUM CHLORIDE, POTASSIUM CHLORIDE, SODIUM LACTATE AND CALCIUM CHLORIDE: 600; 310; 30; 20 INJECTION, SOLUTION INTRAVENOUS at 15:12

## 2019-07-02 ASSESSMENT — MIFFLIN-ST. JEOR: SCORE: 1113.23

## 2019-07-02 NOTE — DISCHARGE INSTRUCTIONS
GENERAL ANESTHESIA OR SEDATION ADULT DISCHARGE INSTRUCTIONS   SPECIAL PRECAUTIONS FOR 24 HOURS AFTER SURGERY    IT IS NOT UNUSUAL TO FEEL LIGHT-HEADED OR FAINT, UP TO 24 HOURS AFTER SURGERY OR WHILE TAKING PAIN MEDICATION.  IF YOU HAVE THESE SYMPTOMS; SIT FOR A FEW MINUTES BEFORE STANDING AND HAVE SOMEONE ASSIST YOU WHEN YOU GET UP TO WALK OR USE THE BATHROOM.    YOU SHOULD REST AND RELAX FOR THE NEXT 24 HOURS AND YOU MUST MAKE ARRANGEMENTS TO HAVE SOMEONE STAY WITH YOU FOR AT LEAST 24 HOURS AFTER YOUR DISCHARGE.  AVOID HAZARDOUS AND STRENUOUS ACTIVITIES.  DO NOT MAKE IMPORTANT DECISIONS FOR 24 HOURS.    DO NOT DRIVE ANY VEHICLE OR OPERATE MECHANICAL EQUIPMENT FOR 24 HOURS FOLLOWING THE END OF YOUR SURGERY.  EVEN THOUGH YOU MAY FEEL NORMAL, YOUR REACTIONS MAY BE AFFECTED BY THE MEDICATION YOU HAVE RECEIVED.    DO NOT DRINK ALCOHOLIC BEVERAGES FOR 24 HOURS FOLLOWING YOUR SURGERY.    DRINK CLEAR LIQUIDS (APPLE JUICE, GINGER ALE, 7-UP, BROTH, ETC.).  PROGRESS TO YOUR REGULAR DIET AS YOU FEEL ABLE.    YOU MAY HAVE A DRY MOUTH, A SORE THROAT, MUSCLES ACHES OR TROUBLE SLEEPING.  THESE SHOULD GO AWAY AFTER 24 HOURS.    CALL YOUR DOCTOR FOR ANY OF THE FOLLOWING:  SIGNS OF INFECTION (FEVER, GROWING TENDERNESS AT THE SURGERY SITE, A LARGE AMOUNT OF DRAINAGE OR BLEEDING, SEVERE PAIN, FOUL-SMELLING DRAINAGE, REDNESS OR SWELLING.    IT HAS BEEN OVER 8 TO 10 HOURS SINCE SURGERY AND YOU ARE STILL NOT ABLE TO URINATE (PASS WATER).       HOME CARE FOLLOWING INGUINAL/FEMORAL HERNIA REPAIR  Jeannine Samuels, GORAN Damon, CHALO Valdez & SHAQUILLE Grant    DIET:  No restrictions.  Increased fluid intake is recommended. While taking pain medications, increase dietary fiber or add a fiber supplementation like Metamucil or Citrucel to help prevent constipation - a possible side effect of pain medications.      NAUSEA:  If nauseated from the anesthetic/pain meds; rest in bed, get up cautiously with assistance, and drink clear liquids  (juice, tea, broth).    ACTIVITY:  Light Activity -- you may immediately be up and about as tolerated.  Driving -- you may drive when comfortable and off narcotic pain medications.  Light Work -- resume when comfortable off pain medications.  (If you can drive, you probably can work.)  Strenuous Work/Activity -- limit lifting to 20 pounds for 3 weeks.  Active Sports (running, biking, etc.) -- cautiously resume after 2 weeks.    INCISIONAL CARE:    If you have a dressing in place, keep clean and dry for 48 hours; you may replace the gauze if it becomes soiled.    After 48 hours you may remove the dressing and shower.  Do not submerse incision in water for 1 week.    If you have a Dermabond dressing (a type of skin glue), you may shower immediately.    Sutures will absorb and need not be removed.    If present, leave the steri-strips (white paper tapes) in place for 14 days after surgery..    If present, leave Dermabond glue in place until it wears/flakes off.    Expect a variable amount of swelling/black and blue discoloration that may involve the penis/scrotum or labia.    Some numbness around the incision is common.    A lump/ridge under the incision is normal and will gradually resolve.    DISCOMFORT:  Local anesthetic placed at surgery should provide relief for 4-8 hours.  Begin taking pain pills before discomfort is severe.  Take the pain medication with some food, when possible, to minimize side effects.  Intermittent use of ice packs to the hernia repair site may help during the first 1 - 3 weeks after surgery.  Expect gradual improvement.  Over-the-counter anti-inflammatory medications (I.e. Ibuprofen/Advil/Motrin or Naprosyn/Aleve) may be   used per package instructions in addition to or while tapering off the narcotic pain medications to decrease   swelling and sensitivity at the repair site.  DO NOT TAKE these anti-inflammatory medications if your primary   physician has advised against doing so, or if you  "have acid reflux, ulcer or bleeding disorder, or take blood   thinner medications.  Call your primary or the surgery office if you have medication questions.     RETURN APPOINTMENT:  Schedule a follow-up visit 2-3 weeks post-op.  Office Phone:  [phone]    CONTACT US IF THE FOLLOWING DEVELOPS:  1.  A fever that is above 101   2.  If there is a large amount of drainage, bleeding, or swelling.  3.  Severe pain that is not relieved by your prescription.  4.  Drainage that is thick, cloudy, yellow, green or white.  5.  Any other questions not answered by \"Frequently Asked Questions\" sheet.          FREQUENTLY ASKED QUESTIONS AFTER SURGERY  Jeannine Samuels, GORAN Damon, CHALO Valdez  & SHAQUILLE Grant      Q:  How should my incision look?    A:  Normally your incision will appear slightly swollen with light redness directly along the incision itself as it heals.  It may feel like a bump or ridge as the healing/scarring happens, and over time (3-4 months) this bump or ridge feeling should slowly go away.  In general, clear or pink watery drainage can be normal at first as your incision heals, but should decrease over time.    Q:  How do I know if my incision is infected?  A:  Look at your incision for signs of infection, like redness around the incision spreading to surrounding skin, or drainage of cloudy or foul-smelling drainage.  If you feel warm, check your temperature to see if you are running a fever.    **If any of these things occur, please notify the nurse at our office.  We may need you to come into the office for an incision check.      Q:  How do I take care of my incision?  A:  If you have a dressing in place - Starting the day after surgery, replace the dressing 1-2 times a day until there is no further drainage from the incision.  At that time, a dressing is no longer needed.  Try to minimize tape on the skin if irritation is occurring at the tape sites.  If you have significant irritation from " tape on the skin, please call the office to discuss other method of dressing your incision.    Small pieces of tape called  steri-strips  may be present directly overlying your incision; these may be removed 10 days after surgery unless otherwise specified by your surgeon.  If these tapes start to loosen at the ends, you may trim them back until they fall off or are removed.    A:  If you had  Dermabond  tissue glue used as a dressing (this causes your incision to look shiny with a clear covering over it) - This type of dressing wears off with time and does not require more dressings over the top unless it is draining around the glue as it wears off.  Do not apply ointments or lotions over the incisions until the glue has completely worn off.    Q:  There is a piece of tape or a sticky  lead  still on my skin.  Can I remove this?  A:  Sometimes the sticky  leads  used for monitoring during surgery or for evaluation in the emergency department are not all removed while you are in the hospital.  These sometimes have a tab or metal dot on them.  You can easily remove these on your own, like taking off a band-aid.  If there is a gel substance under the  lead , simply wipe/clean it off with a washcloth or paper towel.      Q:  What can I do to minimize constipation (very hard stools, or lack of stools)?  A:  Stay well hydrated.  Increase your dietary fiber intake or take a fiber supplement -with plenty of water.  Walk around frequently.  You may consider an over-the-counter stool-softener.  Your Pharmacist can assist you with choosing one that is stocked at your pharmacy.  Constipation is also one of the most common side effects of pain medication.  If you are using pain medication, be pro-active and try to PREVENT problems with constipation by taking the steps above BEFORE constipation becomes a problem.    Q:  What do I do if I need more pain medications?  A:  Call the office to receive refills.  Be aware that certain  pain meds cannot be called into a pharmacy and actually require a paper prescription.  A change may be made in your pain med as you progress thru your recovery period or if you have side effects to certain meds.    --Pain meds are NOT refilled after 5pm on weekdays, and NOT AT ALL on the weekends, so please look ahead to prevent problems.    Q:  Why am I having a hard time sleeping now that I am at home?  A:  Many medications you receive while you are in the hospital can impact your sleep for a number of days after your surgery/hospitalization.  Decreased level of activity and naps during the day may also make sleeping at night difficult.  Try to minimize day-time naps, and get up frequently during the day to walk around your home during your recovery time.  Sleep aides may be of some help, but are not recommended for long-term use.      Q:  I am having some back discomfort.  What should I do?  A:  This may be related to certain positioning that was required for your surgery, extended periods of time in bed, or other changes in your overall activity level.  You may try ice, heat, acetaminophen, or ibuprofen to treat this temporarily.  Note that many pain medications have acetaminophen in them and would state this on the prescription bottle.  Be sure not to exceed the maximum of 4000mg per day of acetaminophen.     **If the pain you are having does not resolve, is severe, or is a flare of back pain you have had on other occasions prior to surgery, please contact your primary physician for further recommendations or for an appointment to be examined at their office.    Q:  Why am I having headaches?  A:  Headaches can be caused by many things:  caffeine withdrawal, use of pain meds, dehydration, high blood pressure, lack of sleep, over-activity/exhaustion, flare-up of usual migraine headaches.  If you feel this is related to muscle tension (a band-like feeling around the head, or a pressure at the low-back of the  head) you may try ice or heat to this area.  You may need to drink more fluids (try electrolyte drink like Gatorade), rest, or take your usual migraine medications.   **If your headaches do not resolve, worsen, are accompanied by other symptoms, or if your blood pressure is high, please call your primary physician for recommendation and/or examination.    Q:  I am unable to urinate.  What do I do?  A:  A small percentage of people can have difficulty urinating initially after surgery.  This includes being able to urinate only a very small amount at a time and feeling discomfort or pressure in the very low abdomen.  This is called  urinary retention , and is actually an urgent situation.  Proceed to your nearest Emergency department for evaluation (not an Urgent Care Center).  Sometimes the bladder does not work correctly after certain medications you receive during surgery, or related to certain procedures.  You may need to have a catheter placed until your bladder recovers.  When planning to go to an Emergency department, it may help to call the ER to let them know you are coming in for this problem after a surgery.  This may help you get in quicker to be evaluated.  **If you have symptoms of a urinary tract infection, please contact your primary physician for the proper evaluation and treatment.    If you have other questions, please call the office Monday thru Friday between 8am and 5pm to discuss with the nurse or physician assistant.  #[phone]    There is a surgeon ON CALL on weekday evenings and over the weekend in case of urgent need only, and may be contacted at the same number.    If you are having an emergency, call 911 or proceed to your nearest emergency department.

## 2019-07-02 NOTE — ANESTHESIA POSTPROCEDURE EVALUATION
Patient: Danielle Marmolejo    Procedure(s):  Robot Assisted Left Femoral Hernia Repair with Mesh    Diagnosis:Femoral Hernia  Diagnosis Additional Information: left femoral hernia    Anesthesia Type:  General, ETT    Note:  Anesthesia Post Evaluation    Patient location during evaluation: PACU  Patient participation: Able to fully participate in evaluation  Level of consciousness: awake and alert  Pain management: adequate  Airway patency: patent  Cardiovascular status: acceptable  Respiratory status: acceptable  Hydration status: acceptable  PONV: none     Anesthetic complications: None          Last vitals:  Vitals:    07/02/19 1625 07/02/19 1630 07/02/19 1631   BP: 137/63 132/57    Pulse: 76 71    Resp: 12 11    Temp:      SpO2: 98% 92% 94%         Electronically Signed By: Bart Arredondo MD  July 2, 2019  4:42 PM

## 2019-07-02 NOTE — OR NURSING
Pt on 6/13 had a colonoscopy and then subsequently had onset of abd pain.  Pt had bowel surgery for obstruction and since then had a femoral hernia appear.  Pt states no pain at present.  Pt states taking a stool softener everyday for constipation.

## 2019-07-02 NOTE — ANESTHESIA PREPROCEDURE EVALUATION
Anesthesia Pre-Procedure Evaluation    Patient: Danielle Marmolejo   MRN: [id number] : 1945          Preoperative Diagnosis: Femoral Hernia    Procedure(s):  Robot Assisted Left Femoral Hernia Repair with Mesh    Past Medical History:   Diagnosis Date     Acne      Arthritis     shoulders     Constipation      Dermatitis      Polymyalgia rheumatica (H)      PONV (postoperative nausea and vomiting)      Past Surgical History:   Procedure Laterality Date     LAPAROSCOPY DIAGNOSTIC (GENERAL) Left 6/15/2019    Procedure: LAPAROSCOPY, DIAGNOSTIC, reduction of strangulated left femoral hernia;  Surgeon: Fabio Samuels MD;  Location: [facility] OR     SURGICAL HISTORY OF -       lap done for polyp blocking ovaries-not cancerous or precancerous     Anesthesia Evaluation     . Pt has had prior anesthetic. Type: General    No history of anesthetic complications          ROS/MED HX    ENT/Pulmonary:  - neg pulmonary ROS     Neurologic:  - neg neurologic ROS     Cardiovascular:  - neg cardiovascular ROS       METS/Exercise Tolerance:     Hematologic:  - neg hematologic  ROS       Musculoskeletal:   (+)  other musculoskeletal- possible polymyalgia rheumatica      GI/Hepatic:     (+) Other GI/Hepatic femoral hernia      Renal/Genitourinary:  - ROS Renal section negative       Endo:  - neg endo ROS       Psychiatric:  - neg psychiatric ROS       Infectious Disease:  - neg infectious disease ROS       Malignancy:      - no malignancy   Other:    - neg other ROS                      Physical Exam  Normal systems: cardiovascular, pulmonary and dental    Airway   Mallampati: I  TM distance: >3 FB  Neck ROM: full    Dental     Cardiovascular       Pulmonary             Lab Results   Component Value Date    WBC 9.3 06/15/2019    HGB 12.0 06/15/2019    HCT 38.6 06/15/2019     06/15/2019    CRP 5.3 2011     06/15/2019    POTASSIUM 3.8 06/15/2019    CHLORIDE 106 06/15/2019    CO2 29 06/15/2019    BUN 23  "06/15/2019    CR 0.72 06/15/2019     (H) 06/15/2019    AMBER 9.4 06/15/2019    ALBUMIN 3.7 06/15/2019    PROTTOTAL 7.2 06/15/2019    ALT 22 06/15/2019    AST 22 06/15/2019    ALKPHOS 118 06/15/2019    BILITOTAL 0.3 06/15/2019    LIPASE 279 06/15/2019    TSH 0.51 04/26/2012    T4 0.82 08/19/2008       Preop Vitals  BP Readings from Last 3 Encounters:   07/02/19 130/75   06/17/19 144/62   06/09/15 123/74    Pulse Readings from Last 3 Encounters:   06/15/19 84   06/09/15 79   06/08/15 67      Resp Readings from Last 3 Encounters:   07/02/19 16   06/17/19 16   05/14/15 18    SpO2 Readings from Last 3 Encounters:   07/02/19 100%   06/17/19 97%   06/08/15 98%      Temp Readings from Last 1 Encounters:   07/02/19 98  F (36.7  C) (Temporal)    Ht Readings from Last 1 Encounters:   07/02/19 1.702 m (5' 7\")      Wt Readings from Last 1 Encounters:   07/02/19 58.1 kg (128 lb)    Estimated body mass index is 20.05 kg/m  as calculated from the following:    Height as of this encounter: 1.702 m (5' 7\").    Weight as of this encounter: 58.1 kg (128 lb).       Anesthesia Plan      History & Physical Review  History and physical reviewed and following examination; no interval change.    ASA Status:  1 .    NPO Status:  > 8 hours    Plan for General and ETT with Intravenous and Propofol induction. Maintenance will be Balanced.    PONV prophylaxis:  Ondansetron (or other 5HT-3) and Dexamethasone or Solumedrol       Postoperative Care  Postoperative pain management:  IV analgesics and Oral pain medications.      Consents  Anesthetic plan, risks, benefits and alternatives discussed with:  Patient or representative..                 Bart Arredondo MD                    .  "

## 2019-07-02 NOTE — OP NOTE
General Surgery Operative Note    Pre-operative diagnosis:  Left Femoral Hernia with recent strangulation   Post-operative diagnosis: same   Procedure:  Robotic assisted left femoral hernia repair with mesh   Surgeon: Fabio Samuels MD   Assistant(s): Sofia Rodrigues PA-C - the physician assistant was medically necessary to assist in prepping, positioning, camera operation, retraction/exposure and instrument exchange.   Anesthesia: General    Estimated blood loss: 5 cc's   Drains placed: None   Complications:  None   Findings:   Prominent femoral hernia on the left with a small adjacent fat-containing hernia.  Repair was achieved using preperitoneal pro- mesh.     Indications for operation: This is a 73-year-old woman who was recently admitted with a strangulated left femoral hernia.  This was laparoscopically reduced and the patient was observed for any signs of ongoing bowel ischemia.  She now presents for definitive repair of her left femoral hernia.  We discussed robotic assisted repair, along with its risks and complications, in detail.  The patient has agreed to proceed.    Details of the operation: After informed consent, the patient was taken to the operating room where she underwent satisfactory induction of general anesthesia.  The patient was sterilely prepped and draped and a supraumbilical skin incision was made.  Dissection was carried bluntly down to the fascia, which was opened very slightly using electrocautery.  The fascia was extremely thin.  Stay sutures were placed in the robotic camera port was inserted.  Pneumoperitoneum was achieved using CO2 insufflation and 2 robotic 8 mm ports were placed, one in each side.  The patient was placed in slight Trendelenburg position and the robot was brought in and docked without difficulty.  The trocar sites had been infiltrated with 0.5% Marcaine prior to inserting the trochars.    I now proceeded to the robotic console.  The left femoral hernia  was easily visible.  It contains no bowel today.  The peritoneum was scored above the level of the ASIS and a peritoneal flap is dissected down.  This is carried down below the level of Maurice's ligament.  Very medially, there is a fat-containing hernia which is medial to the femoral hernia.  This is reduced.  The femoral hernia is also reduced and the peritoneum was dissected well down.  Some fat is also removed from the obturator canal.  A piece of pro- mesh was now brought onto the field and deployed in the preperitoneal space.  This covers all 3 hernia defects.  The peritoneum was now closed using a running 3-0 V lock suture.  A small hole in the peritoneal flap is closed using a 3-0 Vicryl suture.  Pneumoperitoneum is now released and the supraumbilical fascia was closed using interrupted 0 Vicryl sutures.  Skin incisions are closed using 4-0 subcuticular Vicryl followed by Steri-Strips.    The patient tolerated the procedure well and was transferred to the recovery room in satisfactory condition.  Sponge and needle counts were correct at the close of the case.    Specimens: * No specimens in log *        Fabio Samuels MD

## 2019-07-02 NOTE — ANESTHESIA CARE TRANSFER NOTE
Patient: Danielle Marmolejo    Procedure(s):  Robot Assisted Left Femoral Hernia Repair with Mesh    Diagnosis: Femoral Hernia  Diagnosis Additional Information: No value filed.    Anesthesia Type:   General, ETT     Note:  Airway :Face Mask  Patient transferred to:PACU  Handoff Report: Identifed the Patient, Identified the Reponsible Provider, Reviewed the pertinent medical history, Discussed the surgical course, Reviewed Intra-OP anesthesia mangement and issues during anesthesia, Set expectations for post-procedure period and Allowed opportunity for questions and acknowledgement of understanding      Vitals: (Last set prior to Anesthesia Care Transfer)    CRNA VITALS  7/2/2019 1531 - 7/2/2019 1622      7/2/2019             Pulse:  82    SpO2:  98 %                Electronically Signed By: MARIA A Devi CRNA  July 2, 2019  4:22 PM

## 2019-07-05 ENCOUNTER — TELEPHONE (OUTPATIENT)
Dept: SURGERY | Facility: CLINIC | Age: 74
End: 2019-07-05

## 2019-07-05 NOTE — TELEPHONE ENCOUNTER
"  GENERAL SURGERY NURSE PHONE TRIAGE   Danielle Marmolejo    MRN# [id number]  AGE:  73 year old  YOB: 1945  [phone] (home)    Surgeon: Dr. Samuels  Surgical Assist:  Sofia Rodrigues PA-C     Surgery type:  Robotic assisted left femoral hernia repair with mesh     Surgery Date: July / 02 / 2019     POD: 3     CHIEF CONCERN:  \"fever, swelling\"     HISTORY OF PRESENT ILLNESS:   Temp of 99.4 F- explained to patient, not unusual post surgery. Inflammatory response.  Generalized abdominal swelling, incision sites healing well, no redness or drainage.  Last bowel movement: 3 days ago.  Patient is passing gas.  Taking Senna.  No complaints of pain.  Taking Ibuprofen for discomfort.    PLAN:   Enema OR magnesium citrate if no BM by tomorrow.  Increase fluids.  If temp is greater than 100.9 F. Increased swelling, redness or drainage or pain, patient will call clinic.  Patient agrees with plan.  Mena Traore RN on 7/5/2019 at 4:57 PM    "

## 2019-07-08 ENCOUNTER — TELEPHONE (OUTPATIENT)
Dept: SURGERY | Facility: CLINIC | Age: 74
End: 2019-07-08

## 2019-07-08 ENCOUNTER — CARE COORDINATION (OUTPATIENT)
Dept: SURGERY | Facility: CLINIC | Age: 74
End: 2019-07-08

## 2019-07-08 DIAGNOSIS — K86.9 PANCREATIC LESION: Primary | ICD-10-CM

## 2019-07-08 DIAGNOSIS — K86.2 PANCREAS CYST: Primary | ICD-10-CM

## 2019-07-08 NOTE — TELEPHONE ENCOUNTER
RE: MR ABDOMEN MAGNETIC RESONANCE CHOLANGIOPANCREATOGRAPHY WITHOUT AND  WITH CONTRAST  6/27/2019 11:15 AM  Per Dr. Samuels request.  Patient notified Dr. Samuels discussed case with Dr. Huynh at [facility].    He recommended follow-up with 1 of the GI specialists.  Specifically, he suggested Dr. Mahesh Hill or Dr. Paras Perez at the [place].  Patient given phone numbers, will call back with any further questions or concerns.  Mena Traore RN on 7/8/2019 at 11:44 AM

## 2019-07-08 NOTE — RESULT ENCOUNTER NOTE
Per Dr. Samuels request.  Patient notified Dr. Samuels discussed case with Dr. Huynh at [facility].    He recommended follow-up with 1 of the GI specialists.  Specifically, he suggested Dr. Mahesh Hill or Dr. Paras Perez at the [place].  Patient given phone numbers, will call back with any further questions or concerns.  Mena Traore RN on 7/8/2019 at 11:44 AM

## 2019-07-08 NOTE — RESULT ENCOUNTER NOTE
Please call the patient.  I discussed the case with Dr. Huynh at the [facility].  He recommended follow-up with 1 of the GI specialists.  Specifically, he suggested Dr. Mahesh Hill or Dr. Paras Perez at the [place].  Thanks,  DFB

## 2019-07-08 NOTE — PROGRESS NOTES
Care Coordination New Patient Referral  Surgical Oncology and GI    NP referral date: 07/08/19  Referred to MD: DEDRA Hill    Referring MD: Fabio Samuels  Referring contact information not provided    Diagnosis: pancreas cyst    Imaging:   CT yes  Location: [facility]  Date:  6/15/19  IMPRESSION:  1. Mechanical small bowel obstruction due to an incarcerated small  bowel loop in a left internal hernia. Recommend surgical consultation.  2. Small low-dense area in the pancreatic body which may be some focal  duct dilatation or cystic change. This is not an acute finding.  Recommend follow-up outpatient MRCP.    MRI   Location:  [facility]  Date:  6/27/19  IMPRESSION:  1. Area of abnormality in the pancreatic tail on prior CT corresponds  to dilated side branch pancreatic ducts consistent with intraductal  papillary mucinous neoplasm. No associated enhancement or solid  pancreatic mass. Main pancreatic duct is nondilated and normal in  course. No biliary ductal motion.  2. Splenic and left renal cyst. No further follow-up of these lesions  is suggested. Abdominal organs are otherwise within normal limits    Procedures:  none  7/2/19 Left Femoral Hernia repair with mesh    Referral was reviewed by Dr. Hill.  See his note.    Referral sent to endoscopy scheduling on 07/12/19 at 10 am via in basket staff message and the patient will be contacted with appointment for EUS.     07/12/19 I have contacted the patient and she verbalizes understanding to plan of care.  I have explained EUS procedure.    Catrina Hill  BSN, HNBC  RN Care Coordinator  Surgical Oncology  Ph: [phone]  Email: [email or web address]

## 2019-07-10 NOTE — PROGRESS NOTES
Reviewed imaging. Pt w incidental pancreatic cyst on CT during eval for inguinal hernia.  MRI confirmed cystic and appears to be dilated sidebranch. No enhancement or solid lesion.    I would favor EUS then follow-up based on imaging.   If pt would prefer, I can meet in clinic first to discuss. Otherwise schedule EUS with MAC in GI lab.    SHAQUILLE Hill MD  Associate Professor of Medicine  Division of Gastroenterology, Hepatology and Nutrition  [facility]

## 2019-07-19 ENCOUNTER — OFFICE VISIT (OUTPATIENT)
Dept: SURGERY | Facility: CLINIC | Age: 74
End: 2019-07-19
Payer: COMMERCIAL

## 2019-07-19 VITALS
WEIGHT: 128 LBS | HEART RATE: 68 BPM | HEIGHT: 67 IN | DIASTOLIC BLOOD PRESSURE: 78 MMHG | SYSTOLIC BLOOD PRESSURE: 124 MMHG | OXYGEN SATURATION: 100 % | BODY MASS INDEX: 20.09 KG/M2 | RESPIRATION RATE: 16 BRPM

## 2019-07-19 DIAGNOSIS — Z09 SURGICAL FOLLOWUP VISIT: Primary | ICD-10-CM

## 2019-07-19 PROCEDURE — 99024 POSTOP FOLLOW-UP VISIT: CPT | Performed by: SURGERY

## 2019-07-19 ASSESSMENT — MIFFLIN-ST. JEOR: SCORE: 1113.23

## 2019-07-19 NOTE — LETTER
2019       Re: Danielle Marmolejo - 1945    Patient presents today to follow-up after her recent robotic assisted femoral hernia repair with mesh. She has no complaints and is feeling essentially back to normal.  She is scheduled for follow-up with the gastroenterology service at the [facility] regarding her pancreatic cystic lesion.  Interestingly, the patient is noticed that the muscular soreness and tightness in her legs has resolved since her most recent surgery.     The patient's incisions are well-healed.  There is no evidence of any significant swelling in the femoral region.     The patient is doing well.  She may return to see me on an as-needed basis.     Fabio Samuels MD  [facility]

## 2019-07-19 NOTE — PROGRESS NOTES
Patient presents today to follow-up after her recent robotic assisted femoral hernia repair with mesh.  She has no complaints and is feeling essentially back to normal.  She is scheduled for follow-up with the gastroenterology service at the [facility] regarding her pancreatic cystic lesion.  Interestingly, the patient is noticed that the muscular soreness and tightness in her legs has resolved since her most recent surgery.    The patient's incisions are well-healed.  There is no evidence of any significant swelling in the femoral region.    The patient is doing well.  She may return to see me on an as-needed basis.    Fabio Samuels MD  [facility]

## 2019-07-26 ENCOUNTER — TRANSFERRED RECORDS (OUTPATIENT)
Dept: HEALTH INFORMATION MANAGEMENT | Facility: CLINIC | Age: 74
End: 2019-07-26

## 2019-07-26 ENCOUNTER — TELEPHONE (OUTPATIENT)
Dept: GASTROENTEROLOGY | Facility: CLINIC | Age: 74
End: 2019-07-26

## 2019-07-26 NOTE — TELEPHONE ENCOUNTER
Patient Name: Danielle Marmolejo   : 1945  MRN: [id number]       : [x] N/A   [] Yes:  Language  /  ID:      Additional Information regarding appointment:      Patient scheduled for:  [] EGD  [] Colonoscopy  [x] EUS  [] Flex Sig   [] Other:      Indication for procedure. [] Screening   [x]  Pancreatic Cyst    Sedation Type: [] Conscious Sedation   [x] MAC   [] None    Procedure Provider: Dr. Hill        Referring Provider. Dr. Rohit Samuels    Arrival time verified: 9:30 am /     Facility location verified:   [x][facility] - [address], 1st Floor, [address]    Pt meets medical necessity for outpatient procedure in hospital Endoscopy Unit:     [x] N/A for this Payor (non-BCBS)  [][facility] [address], 5th floor     [][facility] OR - [address], 3rd Floor Surgery check-in      Prep Type:   []Golytely eRx: ;  [] MoviPrep:  , [] MiraLax:  , [] Fleet x 2:    [x]NPO /p MN, No solid food /p 2200 the night before    Anticoagulants or blood thinners: [x]None [] ASA 81mg  - may continue           [] Warfarin   [] Warfarin + Lovenox bridge [] Plavix [] Effient [] Eliquis         [] Xarelto  [] Brilinta [] NSAIDS  [] Other    LAST anticoagulant dose: Date/Time:    INR:      Electronic implanted devices: [x] No  [] IPG  []  ICD  []  LVAD  []      H&P / Pre op physical completed: [] N/A, [x] Complete, Date   with Dr. Rohit Samuels , [] Scheduled, Date  , [] No,      Additional Information:  Patient request for earlier pre assessment call as she will be travelling and will not be available 1 week prior to call.     _______________________________________________      Instructions given: [x] Rec'd & Read   [x] Reviewed       [] Resent via [employer] - This includes     instructions, Conscious Sedation policy /  MAC Instructions, procedure date/time/location/provider.       [] Resent via eMail - eMail address confirmed: Real  requests resend endoscopy appointment information communication via unencrypted e-mail. This includes    instructions,    Map, Conscious Sedation / MAC policy, procedure date/time/location/provider.  Pt acknowledges that they have agreed to accept the risks and receive unencrypted communications for this incidence.       Pre procedure teaching completed: [x] Yes - Reviewed, [] No,   [x] No questions regarding Sedation as ordered, []      Transportation from procedure & responsible adult to be with patient  following procedure for a minimum of 6 hrs (Conscious Sedation) 24 hrs (MAC): [x] Yes   - confirmed will have post-procedure companionship as required, [] Pending  , [] No      Isabel Cassidy RN  [facility] Endoscopy

## 2019-08-13 ENCOUNTER — ANESTHESIA (OUTPATIENT)
Dept: GASTROENTEROLOGY | Facility: CLINIC | Age: 74
End: 2019-08-13
Payer: COMMERCIAL

## 2019-08-13 ENCOUNTER — TELEPHONE (OUTPATIENT)
Dept: GASTROENTEROLOGY | Facility: CLINIC | Age: 74
End: 2019-08-13

## 2019-08-13 ENCOUNTER — ANESTHESIA EVENT (OUTPATIENT)
Dept: GASTROENTEROLOGY | Facility: CLINIC | Age: 74
End: 2019-08-13
Payer: COMMERCIAL

## 2019-08-13 ENCOUNTER — HOSPITAL ENCOUNTER (OUTPATIENT)
Facility: CLINIC | Age: 74
Discharge: HOME | End: 2019-08-13
Attending: INTERNAL MEDICINE | Admitting: INTERNAL MEDICINE
Payer: COMMERCIAL

## 2019-08-13 VITALS
TEMPERATURE: 97.5 F | HEART RATE: 77 BPM | SYSTOLIC BLOOD PRESSURE: 148 MMHG | OXYGEN SATURATION: 99 % | RESPIRATION RATE: 18 BRPM | DIASTOLIC BLOOD PRESSURE: 68 MMHG

## 2019-08-13 LAB — UPPER EUS: NORMAL

## 2019-08-13 PROCEDURE — 25000128 H RX IP 250 OP 636: Performed by: NURSE ANESTHETIST, CERTIFIED REGISTERED

## 2019-08-13 PROCEDURE — 43242 EGD US FINE NEEDLE BX/ASPIR: CPT | Performed by: INTERNAL MEDICINE

## 2019-08-13 PROCEDURE — 37000008 ZZH ANESTHESIA TECHNICAL FEE, 1ST 30 MIN: Performed by: INTERNAL MEDICINE

## 2019-08-13 PROCEDURE — 25000128 H RX IP 250 OP 636: Performed by: INTERNAL MEDICINE

## 2019-08-13 PROCEDURE — 37000009 ZZH ANESTHESIA TECHNICAL FEE, EACH ADDTL 15 MIN: Performed by: INTERNAL MEDICINE

## 2019-08-13 PROCEDURE — 25000125 ZZHC RX 250: Performed by: NURSE ANESTHETIST, CERTIFIED REGISTERED

## 2019-08-13 PROCEDURE — 25800030 ZZH RX IP 258 OP 636: Performed by: NURSE ANESTHETIST, CERTIFIED REGISTERED

## 2019-08-13 PROCEDURE — 43259 EGD US EXAM DUODENUM/JEJUNUM: CPT | Performed by: INTERNAL MEDICINE

## 2019-08-13 RX ORDER — PROPOFOL 10 MG/ML
INJECTION, EMULSION INTRAVENOUS PRN
Status: DISCONTINUED | OUTPATIENT
Start: 2019-08-13 | End: 2019-08-13

## 2019-08-13 RX ORDER — SODIUM CHLORIDE, SODIUM LACTATE, POTASSIUM CHLORIDE, CALCIUM CHLORIDE 600; 310; 30; 20 MG/100ML; MG/100ML; MG/100ML; MG/100ML
INJECTION, SOLUTION INTRAVENOUS CONTINUOUS PRN
Status: DISCONTINUED | OUTPATIENT
Start: 2019-08-13 | End: 2019-08-13

## 2019-08-13 RX ORDER — ONDANSETRON 2 MG/ML
INJECTION INTRAMUSCULAR; INTRAVENOUS PRN
Status: DISCONTINUED | OUTPATIENT
Start: 2019-08-13 | End: 2019-08-13

## 2019-08-13 RX ORDER — NALOXONE HYDROCHLORIDE 0.4 MG/ML
.1-.4 INJECTION, SOLUTION INTRAMUSCULAR; INTRAVENOUS; SUBCUTANEOUS
Status: CANCELLED | OUTPATIENT
Start: 2019-08-13 | End: 2019-08-14

## 2019-08-13 RX ORDER — MEPERIDINE HYDROCHLORIDE 50 MG/ML
12.5 INJECTION INTRAMUSCULAR; INTRAVENOUS; SUBCUTANEOUS
Status: CANCELLED | OUTPATIENT
Start: 2019-08-13

## 2019-08-13 RX ORDER — SODIUM CHLORIDE, SODIUM LACTATE, POTASSIUM CHLORIDE, CALCIUM CHLORIDE 600; 310; 30; 20 MG/100ML; MG/100ML; MG/100ML; MG/100ML
INJECTION, SOLUTION INTRAVENOUS CONTINUOUS
Status: CANCELLED | OUTPATIENT
Start: 2019-08-13

## 2019-08-13 RX ORDER — LIDOCAINE HYDROCHLORIDE 20 MG/ML
INJECTION, SOLUTION INFILTRATION; PERINEURAL PRN
Status: DISCONTINUED | OUTPATIENT
Start: 2019-08-13 | End: 2019-08-13

## 2019-08-13 RX ORDER — ONDANSETRON 4 MG/1
4 TABLET, ORALLY DISINTEGRATING ORAL EVERY 30 MIN PRN
Status: CANCELLED | OUTPATIENT
Start: 2019-08-13

## 2019-08-13 RX ORDER — FENTANYL CITRATE 50 UG/ML
INJECTION, SOLUTION INTRAMUSCULAR; INTRAVENOUS PRN
Status: DISCONTINUED | OUTPATIENT
Start: 2019-08-13 | End: 2019-08-13

## 2019-08-13 RX ORDER — LEVOFLOXACIN 5 MG/ML
500 INJECTION, SOLUTION INTRAVENOUS ONCE
Status: COMPLETED | OUTPATIENT
Start: 2019-08-13 | End: 2019-08-13

## 2019-08-13 RX ORDER — FLUMAZENIL 0.1 MG/ML
0.2 INJECTION, SOLUTION INTRAVENOUS
Status: CANCELLED | OUTPATIENT
Start: 2019-08-13 | End: 2019-08-14

## 2019-08-13 RX ORDER — PROPOFOL 10 MG/ML
INJECTION, EMULSION INTRAVENOUS CONTINUOUS PRN
Status: DISCONTINUED | OUTPATIENT
Start: 2019-08-13 | End: 2019-08-13

## 2019-08-13 RX ORDER — FENTANYL CITRATE 50 UG/ML
25-50 INJECTION, SOLUTION INTRAMUSCULAR; INTRAVENOUS
Status: DISCONTINUED | OUTPATIENT
Start: 2019-08-13 | End: 2019-08-13 | Stop reason: HOSPADM

## 2019-08-13 RX ORDER — LIDOCAINE 40 MG/G
CREAM TOPICAL
Status: DISCONTINUED | OUTPATIENT
Start: 2019-08-13 | End: 2019-08-13 | Stop reason: HOSPADM

## 2019-08-13 RX ORDER — ONDANSETRON 2 MG/ML
4 INJECTION INTRAMUSCULAR; INTRAVENOUS EVERY 30 MIN PRN
Status: CANCELLED | OUTPATIENT
Start: 2019-08-13

## 2019-08-13 RX ADMIN — MIDAZOLAM 1 MG: 1 INJECTION INTRAMUSCULAR; INTRAVENOUS at 11:19

## 2019-08-13 RX ADMIN — TOPICAL ANESTHETIC 1 EACH: 200 SPRAY DENTAL; PERIODONTAL at 11:19

## 2019-08-13 RX ADMIN — LIDOCAINE HYDROCHLORIDE 40 MG: 20 INJECTION, SOLUTION INFILTRATION; PERINEURAL at 11:26

## 2019-08-13 RX ADMIN — LEVOFLOXACIN 500 MG: 5 INJECTION, SOLUTION INTRAVENOUS at 11:41

## 2019-08-13 RX ADMIN — FENTANYL CITRATE 50 MCG: 50 INJECTION, SOLUTION INTRAMUSCULAR; INTRAVENOUS at 11:23

## 2019-08-13 RX ADMIN — SODIUM CHLORIDE, POTASSIUM CHLORIDE, SODIUM LACTATE AND CALCIUM CHLORIDE: 600; 310; 30; 20 INJECTION, SOLUTION INTRAVENOUS at 11:19

## 2019-08-13 RX ADMIN — PROPOFOL 100 MCG/KG/MIN: 10 INJECTION, EMULSION INTRAVENOUS at 11:26

## 2019-08-13 RX ADMIN — ONDANSETRON 4 MG: 2 INJECTION INTRAMUSCULAR; INTRAVENOUS at 11:30

## 2019-08-13 RX ADMIN — PROPOFOL 20 MG: 10 INJECTION, EMULSION INTRAVENOUS at 11:54

## 2019-08-13 NOTE — ANESTHESIA CARE TRANSFER NOTE
Patient: Danielle Marmolejo    Procedure(s):  ESOPHAGOGASTRODUODENOSCOPY, WITH FINE NEEDLE ASPIRATION BIOPSY, WITH ENDOSCOPIC ULTRASOUND GUIDANCE    Diagnosis: Pancreas cyst- Prep mailed  Diagnosis Additional Information: No value filed.    Anesthesia Type:   MAC     Note:  Airway :Room Air  Patient transferred to:Phase II  Comments: To endoscopy phase 2 with CRNA. Pt alert, appropriate, VSS on RA. Report and care to RNHandoff Report: Identifed the Patient, Identified the Reponsible Provider, Reviewed the pertinent medical history, Discussed the surgical course, Reviewed Intra-OP anesthesia mangement and issues during anesthesia, Set expectations for post-procedure period and Allowed opportunity for questions and acknowledgement of understanding      Vitals: (Last set prior to Anesthesia Care Transfer)    CRNA VITALS  8/13/2019 1133 - 8/13/2019 1209      8/13/2019             Pulse:  98    Ht Rate:  97    SpO2:  100 %    Resp Rate (observed):  14    EKG:  Sinus rhythm                Electronically Signed By: MARIA A Aviles CRNA  August 13, 2019  12:09 PM

## 2019-08-13 NOTE — TELEPHONE ENCOUNTER
See EUS report.    Please arrange for MRI/MRCP with contrast in 1 year and clinic follow-up with me same day.    SHAQUILLE Hill MD  Associate Professor of Medicine  Division of Gastroenterology, Hepatology and Nutrition  [facility]

## 2019-08-13 NOTE — DISCHARGE INSTRUCTIONS
[facility] Endoscopic Ultrasound with Monitored Anesthesia Care  For 24 hours after your procedure  Sedation:  1. Get plenty of rest. A responsible adult must stay with you for at least 24 hours after you leave the hospital.   2. Do not drive or use heavy equipment. If you have weakness or tingling, don't drive or use heavy equipment until this feeling goes away.  3. Do not drink alcohol.  4. Avoid strenuous or risky activities. Ask for help when climbing stairs.   5. You may feel lightheaded. IF so, sit for a few minutes before standing. Have someone help you get up.   6. If you have nausea (feel sick to your stomach): Drink only clear liquids such as apple juice, ginger ale, broth or 7-Up. Rest may also help. Be sure to drink enough fluids. Move to a regular diet as you feel able.  7. You may have a slight fever. Call the doctor if your fever is over 100 F (37.7 C) (taken under the tongue) or lasts longer than 24 hours.  8. You may have a dry mouth, a sore throat, muscle aches or trouble sleeping. These should go away after 24 hours.  9. Do not make important or legal decisions.   Procedural:  1. Wait one hour before eating or drinking. Start with sips of water. When your gag reflex has returned, you may return to your normal diet, medicines, and light exercise.  2. Some bloating is normal. You may have large burps or pass air.  3. You may have a sore throat for 2 to 3 days. If so, it may help to:    Avoid hot liquids for 24 hours.    Use sore throat lozenges.    Gargle as need with salt water up to 4 times a day. Mix 1 cup of warm water with 1 teaspoon of salt. Do not swallow.  4. You may take Tylenol (acetaminophen) for pain unless your doctor has told you not to.   Do not take aspirin or ibuprofen (Advil, Motrin, or other anti-inflammatory  drugs) for __3___ days.  Call right away if you have:  1. Unusual pain in belly or chest pain not relieved with passing air.  2. Severe throat pain or trouble  swallowing.  3. Black stools (tar-like looking bowel movement).  4. Signs of infection (fever)  5. It has been over 8 to 10 hours since surgery and you are still not able to urinate (pass water).  6. Headache for over 24 hours.  7.   Follow-up:  ____If you have severe pain, bleeding, vomit blood, or shortness of breath, go to an emergency room.  If you have questions, call:  Endoscopy: Monday to Friday, 7 a.m. to 4:30 p.m. [phone] (We may have to call you back)  After hours: Hospital  [phone] (Ask for the GI fellow on call)

## 2019-08-13 NOTE — ANESTHESIA POSTPROCEDURE EVALUATION
Anesthesia POST Procedure Evaluation    Patient: Danielle Marmolejo   MRN:     [id number] Gender:   female   Age:    73 year old :      1945        Preoperative Diagnosis: Pancreas cyst- Prep mailed   Procedure(s):  ESOPHAGOGASTRODUODENOSCOPY, WITH FINE NEEDLE ASPIRATION BIOPSY, WITH ENDOSCOPIC ULTRASOUND GUIDANCE   Postop Comments: No value filed.       Anesthesia Type:  Not documented  MAC    Reportable Event: NO     PAIN: Uncomplicated   Sign Out status: Comfortable, Well controlled pain     PONV: No PONV   Sign Out status:  No Nausea or Vomiting     Neuro/Psych: Uneventful perioperative course   Sign Out Status: Preoperative baseline; Age appropriate mentation     Airway/Resp.: Uneventful perioperative course   Sign Out Status: Non labored breathing, age appropriate RR; Resp. Status within EXPECTED Parameters     CV: Uneventful perioperative course   Sign Out status: Appropriate BP and perfusion indices; Appropriate HR/Rhythm     Disposition:   Sign Out in:  GI suite  Recovery Course: Uneventful  Follow-Up: Not required           Last Anesthesia Record Vitals:  CRNA VITALS  2019 1133 - 2019 1213      2019             Pulse:  98    Ht Rate:  97    SpO2:  100 %    Resp Rate (observed):  14    EKG:  Sinus rhythm          Last PACU Vitals:  Vitals Value Taken Time   BP     Temp     Pulse     Resp     SpO2     Temp src Available 2019 12:00 PM   NIBP 96/61 2019 11:57 AM   Pulse 98 2019 12:00 PM   SpO2 100 % 2019 12:00 PM   Resp     Temp     Ht Rate 97 2019 12:00 PM   Temp 2           Electronically Signed By: Tyrone Heard MD, 2019, 12:13 PM

## 2019-08-13 NOTE — ANESTHESIA PREPROCEDURE EVALUATION
Anesthesia Pre-Procedure Evaluation    Patient: Danielle Marmolejo   MRN:     [id number] Gender:   female   Age:    73 year old :      1945        Preoperative Diagnosis: Pancreas cyst- Prep mailed   Procedure(s):  ESOPHAGOGASTRODUODENOSCOPY, WITH ENDOSCOPIC US     Past Medical History:   Diagnosis Date     Acne      Arthritis     shoulders     Constipation      Dermatitis      Polymyalgia rheumatica (H)      PONV (postoperative nausea and vomiting)       Past Surgical History:   Procedure Laterality Date     DAVINCI HERNIORRHAPHY INGUINAL Left 2019    Procedure: Robot Assisted Left Femoral Hernia Repair with Mesh;  Surgeon: Fabio Samuels MD;  Location: [facility] OR     LAPAROSCOPY DIAGNOSTIC (GENERAL) Left 6/15/2019    Procedure: LAPAROSCOPY, DIAGNOSTIC, reduction of strangulated left femoral hernia;  Surgeon: Fabio Samuels MD;  Location: [facility] OR     SURGICAL HISTORY OF -       lap done for polyp blocking ovaries-not cancerous or precancerous          Anesthesia Evaluation     .             ROS/MED HX    ENT/Pulmonary:       Neurologic:       Cardiovascular:     (+) hypertension----. : . . . :. .       METS/Exercise Tolerance:     Hematologic:         Musculoskeletal:         GI/Hepatic:         Renal/Genitourinary:         Endo:         Psychiatric:         Infectious Disease:         Malignancy:         Other:                         PHYSICAL EXAM:   Mental Status/Neuro: A/A/O   Airway: Facies: Feasible  Mallampati: I  Mouth/Opening: Full  TM distance: > 6 cm  Neck ROM: Full   Respiratory: Auscultation: CTAB     Resp. Rate: Normal     Resp. Effort: Normal      CV: Rhythm: Regular  Rate: Age appropriate  Heart: Normal Sounds  Edema: None   Comments:      Dental: Normal Dentition                LABS:  CBC:   Lab Results   Component Value Date    WBC 9.3 06/15/2019    WBC 4.6 2014    HGB 12.0 06/15/2019    HGB 13.5 2014    HCT 38.6 06/15/2019    HCT 40.9 2014     06/15/2019  "    08/26/2014     BMP:   Lab Results   Component Value Date     06/15/2019     08/26/2014    POTASSIUM 3.8 06/15/2019    POTASSIUM 4.0 08/26/2014    CHLORIDE 106 06/15/2019    CHLORIDE 103 08/26/2014    CO2 29 06/15/2019    CO2 28 08/26/2014    BUN 23 06/15/2019    BUN 16 08/26/2014    CR 0.72 06/15/2019    CR 0.77 08/26/2014     (H) 06/15/2019    GLC 94 08/26/2014     COAGS: No results found for: PTT, INR, FIBR  POC:   Lab Results   Component Value Date    BGM 89 06/17/2019     OTHER:   Lab Results   Component Value Date    LACT 0.6 (L) 06/15/2019    A1C 5.4 04/02/2011    AMBER 9.4 06/15/2019    ALBUMIN 3.7 06/15/2019    PROTTOTAL 7.2 06/15/2019    ALT 22 06/15/2019    AST 22 06/15/2019    ALKPHOS 118 06/15/2019    BILITOTAL 0.3 06/15/2019    LIPASE 279 06/15/2019    TSH 0.51 04/26/2012    T4 0.82 08/19/2008    CRP 5.3 04/02/2011        Preop Vitals    BP Readings from Last 3 Encounters:   08/13/19 (!) 149/90   07/19/19 124/78   07/02/19 126/71    Pulse Readings from Last 3 Encounters:   07/19/19 68   07/02/19 66   06/15/19 84      Resp Readings from Last 3 Encounters:   08/13/19 12   07/19/19 16   07/02/19 14    SpO2 Readings from Last 3 Encounters:   08/13/19 100%   07/19/19 100%   07/02/19 97%      Temp Readings from Last 1 Encounters:   07/02/19 36.5  C (97.7  F) (Temporal)    Ht Readings from Last 1 Encounters:   07/19/19 1.702 m (5' 7\")      Wt Readings from Last 1 Encounters:   07/19/19 58.1 kg (128 lb)    Estimated body mass index is 20.05 kg/m  as calculated from the following:    Height as of 7/19/19: 1.702 m (5' 7\").    Weight as of 7/19/19: 58.1 kg (128 lb).     LDA:  Peripheral IV 08/13/19 Right (Active)   Number of days: 0        Assessment:   ASA SCORE: 2    H&P: History and physical reviewed and following examination; no interval change.   Smoking Status:  Non-Smoker/Unknown   NPO Status: NPO Appropriate     Plan:   Anes. Type:  MAC   Pre-Medication: None   Induction:  N/a "   Airway: Native Airway   Access/Monitoring: PIV   Maintenance: N/a     Postop Plan:   Postop Pain: None  Postop Sedation/Airway: Not planned  Disposition: Outpatient     PONV Management:  NO PONV Prophylaxis Required     CONSENT: Direct conversation   Plan and risks discussed with: Patient   Blood Products: N/a                   Tyrone Heard MD

## 2020-08-03 ENCOUNTER — PATIENT OUTREACH (OUTPATIENT)
Dept: GASTROENTEROLOGY | Facility: CLINIC | Age: 75
End: 2020-08-03

## 2020-08-03 DIAGNOSIS — K86.2 PANCREAS CYST: Primary | ICD-10-CM

## 2020-08-29 ENCOUNTER — ANCILLARY PROCEDURE (OUTPATIENT)
Dept: MRI IMAGING | Facility: CLINIC | Age: 75
End: 2020-08-29
Attending: INTERNAL MEDICINE
Payer: COMMERCIAL

## 2020-08-29 DIAGNOSIS — K86.2 PANCREAS CYST: ICD-10-CM

## 2020-08-29 LAB
CREAT BLD-MCNC: 0.8 MG/DL (ref 0.52–1.04)
GFR SERPL CREATININE-BSD FRML MDRD: 70 ML/MIN/{1.73_M2}

## 2020-08-29 RX ORDER — GADOBUTROL 604.72 MG/ML
7.5 INJECTION INTRAVENOUS ONCE
Status: COMPLETED | OUTPATIENT
Start: 2020-08-29 | End: 2020-08-29

## 2020-08-29 RX ADMIN — GADOBUTROL 6 ML: 604.72 INJECTION INTRAVENOUS at 10:14

## 2020-08-29 NOTE — DISCHARGE INSTRUCTIONS
MRI Contrast Discharge Instructions    The IV contrast you received today will pass out of your body in your  urine. This will happen in the next 24 hours. You will not feel this process.  Your urine will not change color.    Drink at least 4 extra glasses of water or juice today (unless your doctor  has restricted your fluids). This reduces the stress on your kidneys.  You may take your regular medicines.    If you are on dialysis: It is best to have dialysis today.    If you have a reaction: Most reactions happen right away. If you have  any new symptoms after leaving the hospital (such as hives or swelling),  call your hospital at the correct number below. Or call your family doctor.  If you have breathing distress or wheezing, call 911.    Special instructions: ***    I have read and understand the above information.    Signature:______________________________________ Date:___________    Staff:__________________________________________ Date:___________     Time:__________    [place] [facility] Departments:    ___[place]: [phone]  ___[facility]: [phone]  ___[place]: [phone] ___[facility]: [phone]  ___[facility]: [phone]  ___[place] [facility]: [phone]  ___[phone]  ___[facility]: [phone]  ___[phone]

## 2020-08-29 NOTE — LETTER
September 3, 2020      Dainelle Marmolejo  [address]   [place] [address]        Dear ,    We are writing to inform you of your test results.    This MRI was stable. We will discuss final recommendations at your upcoming clinic visit.    Please feel free to call if you have any questions about this letter. I can be reached at [phone].    SHAQUILLE Hill MD  Professor of Medicine  Division of Gastroenterology, Hepatology and Nutrition  [facility]      Resulted Orders   MR Abdomen MRCP w/o & w Contrast    Narrative    MRCP Without and With Contrast    CLINICAL HISTORY: one year follow up pancreas cyst; Pancreas cyst    DATE: 8/29/2020 11:31 AM    TECHNIQUE:     Images were acquired with and without intravenous gadolinium contrast  through the upper abdomen. The following MR images were acquired  without intravenous contrast: TrueFISP, multiplanar T2-weighted, axial  T1 in/out of phase, T2-weighted MRCP images, axial diffusion-weighted  and axial apparent diffusion coefficient. T1-weighted images were  obtained before contrast at the multiple time points following  contrast injection. 3-D reformatted images were generated by the  technologist. Contrast dose: 7 Gadavist    Comparison study: 6/27/2019    FINDINGS:    Biliary Tree: No intra or extrahepatic biliary tree dilation.    Pancreas: Preserved increased precontrast T1 signal throughout the  pancreas. Prominent main pancreatic duct measuring 4.6 mm, stable.  Again noted confluent cystic lesions at the pancreatic tail the  largest measuring 14 mm, grossly stable. They appear to communicate  with the main pancreatic duct. No convincing worrisome features. No  pancreas divisum.    Liver: Elongated right lobe of the liver likely due to Riedel's lobe,  normal variant. No hepatic steatosis or iron deposition. No suspicious  liver lesions. Patent liver vasculature.    Gallbladder: No cholelithiasis. No gallbladder  wall thickening. No  pericholecystic fluid. Cystic foci at the gallbladder fundus likely  representing adenomyomatosis, stable.    Spleen: Not enlarged. Subcentimeter cyst with increased T2 signal and  no enhancement.    Kidneys: Parapelvic and cortical renal cysts, left greater than right.  No suspicious renal lesions. No hydronephrosis.    Adrenal glands: No adrenal gland nodules.    Bowel: No dilated loops of bowel. Moderate colonic stool burden.    Lymph nodes: Subcentimeter retroperitoneal lymph nodes, not enlarged  by size criteria.    Blood vessels: Patent abdominal vasculature. No abdominal aortic  aneurysm. Duplicated IVC.    Lung bases: Mild bilateral lower lobe dependent atelectasis, left  greater than right. No significant pleural effusions. Enlargement of  the heart, stable. No pericardial effusions.    Bones and soft tissues: The degenerative changes of the spine.  Scattered Schmorl nodes. Diffuse heterogeneous bone marrow signal with  associated diffuse bone demineralization.    Mesentery and abdominal wall: Unremarkable.    Ascites: No ascites.      Impression    IMPRESSION:   1. Overall stable pancreatic cystic lesions most likely representing  side branch intraductal papillary mucinous neoplasm. No convincing  worrisome features. Recommend follow-up according to [facility] guidelines criteria described below.  2. Additional incidental findings as described above.    [facility] recommendations for asymptomatic pancreatic  cysts, modified from international consensus guidelines*   Size of largest cyst:   Less than 1 cm: Follow-up imaging in 6 months to 1 year, then lengthen  interval to 2-3 years if no change.  1-2 cm: Follow-up imaging at 6 months, then yearly for 2 years, then  lengthen interval if no change.   The optimal initial study or first follow-up exam is MRI/MRCP  performed with intravenous gadolinium contrast to allow full cyst  characterization. Once  characterized, contrast is optional on  follow-up MRIs. If MRI is contraindicated, CT with contrast is  recommended.   GI consultation for possible endoscopic ultrasound is recommended for  cysts with the following features: Size > 2 cm, >20% growth on  followup, wall thickening or enhancement, mural nodule, duct > 5 mm,  or abrupt change in duct caliber with distal atrophy. GI or surgical  consultation is also recommended for symptomatic cysts.   *Reference: International Consensus Guidelines for Management of IPMN  and MCN of the pancreas. Pancreatology: 12:(2012); 183-197.    TAYLOR SORTO MD       If you have any questions or concerns, please call the clinic at the number listed above.       Sincerely,        [facility] MRI ROOM 1

## 2020-09-28 ENCOUNTER — PATIENT OUTREACH (OUTPATIENT)
Dept: GASTROENTEROLOGY | Facility: CLINIC | Age: 75
End: 2020-09-28

## 2020-09-28 ENCOUNTER — TELEPHONE (OUTPATIENT)
Dept: GASTROENTEROLOGY | Facility: CLINIC | Age: 75
End: 2020-09-28

## 2020-09-28 ENCOUNTER — VIRTUAL VISIT (OUTPATIENT)
Dept: GASTROENTEROLOGY | Facility: CLINIC | Age: 75
End: 2020-09-28
Attending: INTERNAL MEDICINE
Payer: COMMERCIAL

## 2020-09-28 VITALS — BODY MASS INDEX: 20.99 KG/M2 | WEIGHT: 134 LBS

## 2020-09-28 DIAGNOSIS — K86.2 PANCREAS CYST: Primary | ICD-10-CM

## 2020-09-28 PROCEDURE — 40001009 ZZH VIDEO/TELEPHONE VISIT; NO CHARGE

## 2020-09-28 ASSESSMENT — PAIN SCALES - GENERAL: PAINLEVEL: NO PAIN (0)

## 2020-09-28 NOTE — LETTER
9/28/2020       RE: Danielle Marmolejo  [address]  [place] [address]     Dear Colleague,    Thank you for referring your patient, Danielle Marmolejo, to the [facility] at [facility]. Please see a copy of my visit note below.    73 yo female being seen for follow-up of pancreatic cysts.   Initially incidental finding on CT 6/15/19 for evaluation of inguinal hernia.  MRI 6/27/19 confirmed the cystic nature, with normal main pancreatic duct and dilated sidebranches in the tail.    EUS 8/13/20 showed multiple simple cysts without high-risk or worrisome features. FNA not performed. Largest cyst measured 9 x 5 mm in diameter. Recommended 1 yr follow-up MRI.    MRI 8/29/20 was stable with 4.6 mm main duct. Confluent cysts in tail measuring up to 1.4 cm which was felt stable (difference from EUS due to different tests).    She is feeling well. Reports weight gain associated with recent steroid tx for PMR. No pain, jaundice or diarrhea. Some constipation - improved with magnesium.      A/P: Pancreas cysts. We reviewed stable recent imaging. DIscussued clinical significance and potential risk for pancreatic adenocarcinoma, however this is uncommon. Discussed consensus guidelines for management.    Discussed that I typically stop surveillance at age 74, however given that she had not been previously under surveillance I typically follow for two years then stop if stable.    My office will arrange for repeat MRI in 1 year. I will forward these results to her via [employer] and if stable stop further surveillance. If changed, will contact and arrange for formal visit.      Again, thank you for allowing me to participate in the care of your patient.  Sincerely,    SHAQUILLE Hill MD  Professor of Medicine  Division of Gastroenterology, Hepatology and Nutrition  [facility]

## 2020-09-28 NOTE — TELEPHONE ENCOUNTER
Virtual visit today to discuss MRI results.  No significant changes in pancreas.    Catrina - please arrange for repeat MRI/MRCP with contrast in 1 year. If stable we will stop further surveillance.    We discussed that I can just send her results via [employer] at that time and does not need clinic visit.    SHAQUILLE Hill MD  Professor of Medicine  Division of Gastroenterology, Hepatology and Nutrition  [facility]

## 2020-09-28 NOTE — PROGRESS NOTES
"Danielle Marmolejo is a 74 year old female who is being evaluated via a billable video visit.      The patient has been notified of following:     \"This video visit will be conducted via a call between you and your physician/provider. We have found that certain health care needs can be provided without the need for an in-person physical exam.  This service lets us provide the care you need with a video conversation.  If a prescription is necessary we can send it directly to your pharmacy.  If lab work is needed we can place an order for that and you can then stop by our lab to have the test done at a later time.    Video visits are billed at different rates depending on your insurance coverage.  Please reach out to your insurance provider with any questions.    If during the course of the call the physician/provider feels a video visit is not appropriate, you will not be charged for this service.\"    Patient has given verbal consent for Video visit? Yes  How would you like to obtain your AVS? Mail a copy  If you are dropped from the video visit, the video invite should be resent to: Text to cell phone: [phone]  Will anyone else be joining your video visit? No       Patient needs a text invite [employer] is still pending.    SOFIA Gallo           Video-Visit Details    Type of service:  Video Visit    Video Start Time: 9:16  Video End Time: 9:30    Originating Location (pt. Location): Home    Distant Location (provider location):  [facility]     Platform used for Video Visit: [facility]    73 yo female being seen for follow-up of pancreatic cysts.   Initially incidental finding on CT 6/15/19 for evaluation of inguinal hernia.  MRI 6/27/19 confirmed the cystic nature, with normal main pancreatic duct and dilated sidebranches in the tail.    EUS 8/13/20 showed multiple simple cysts without high-risk or worrisome features. FNA not performed. Largest cyst measured 9 x 5 mm in diameter. Recommended " 1 yr follow-up MRI.    MRI 8/29/20 was stable with 4.6 mm main duct. Confluent cysts in tail measuring up to 1.4 cm which was felt stable (difference from EUS due to different tests).    She is feeling well. Reports weight gain associated with recent steroid tx for PMR. No pain, jaundice or diarrhea. Some constipation - improved with magnesium.      A/P: Pancreas cysts. We reviewed stable recent imaging. DIscussued clinical significance and potential risk for pancreatic adenocarcinoma, however this is uncommon. Discussed consensus guidelines for management.    Discussed that I typically stop surveillance at age 74, however given that she had not been previously under surveillance I typically follow for two years then stop if stable.    My office will arrange for repeat MRI in 1 year. I will forward these results to her via [employer] and if stable stop further surveillance. If changed, will contact and arrange for formal visit.    SHAQUILLE Hill MD  Professor of Medicine  Division of Gastroenterology, Hepatology and Nutrition  [facility]

## 2020-09-28 NOTE — LETTER
"    9/28/2020         RE: Danielle Marmolejo  [address]  [place] [address]        Dear Colleague,    Thank you for referring your patient, Danielle Marmolejo, to the [facility]. Please see a copy of my visit note below.    Danielle Marmolejo is a 74 year old female who is being evaluated via a billable video visit.      The patient has been notified of following:     \"This video visit will be conducted via a call between you and your physician/provider. We have found that certain health care needs can be provided without the need for an in-person physical exam.  This service lets us provide the care you need with a video conversation.  If a prescription is necessary we can send it directly to your pharmacy.  If lab work is needed we can place an order for that and you can then stop by our lab to have the test done at a later time.    Video visits are billed at different rates depending on your insurance coverage.  Please reach out to your insurance provider with any questions.    If during the course of the call the physician/provider feels a video visit is not appropriate, you will not be charged for this service.\"    Patient has given verbal consent for Video visit? Yes  How would you like to obtain your AVS? Mail a copy  If you are dropped from the video visit, the video invite should be resent to: Text to cell phone: [phone]  Will anyone else be joining your video visit? No       Patient needs a text invite mychart is still pending.    SOFIA Gallo           Video-Visit Details    Type of service:  Video Visit    Video Start Time: 9:16  Video End Time: 9:30    Originating Location (pt. Location): Home    Distant Location (provider location):  [facility]     Platform used for Video Visit: [facility]    75 yo female being seen for follow-up of pancreatic cysts.   Initially incidental finding on CT 6/15/19 for evaluation of inguinal hernia.  MRI " 6/27/19 confirmed the cystic nature, with normal main pancreatic duct and dilated sidebranches in the tail.    EUS 8/13/20 showed multiple simple cysts without high-risk or worrisome features. FNA not performed. Largest cyst measured 9 x 5 mm in diameter. Recommended 1 yr follow-up MRI.    MRI 8/29/20 was stable with 4.6 mm main duct. Confluent cysts in tail measuring up to 1.4 cm which was felt stable (difference from EUS due to different tests).    She is feeling well. Reports weight gain associated with recent steroid tx for PMR. No pain, jaundice or diarrhea. Some constipation - improved with magnesium.      A/P: Pancreas cysts. We reviewed stable recent imaging. DIscussued clinical significance and potential risk for pancreatic adenocarcinoma, however this is uncommon. Discussed consensus guidelines for management.    Discussed that I typically stop surveillance at age 74, however given that she had not been previously under surveillance I typically follow for two years then stop if stable.    My office will arrange for repeat MRI in 1 year. I will forward these results to her via [employer] and if stable stop further surveillance. If changed, will contact and arrange for formal visit.    SHAQUILLE Hill MD  Professor of Medicine  Division of Gastroenterology, Hepatology and Nutrition  [facility]            Again, thank you for allowing me to participate in the care of your patient.        Sincerely,        Bart Hill MD

## 2021-08-05 ENCOUNTER — PATIENT OUTREACH (OUTPATIENT)
Dept: GASTROENTEROLOGY | Facility: CLINIC | Age: 76
End: 2021-08-05

## 2021-08-05 NOTE — PROGRESS NOTES
"Called patient to discuss her concerns when  called to arranged annual MRCP    \"I have RA and have had an heart echo, mitral valve is \"crusty\" and I have some fluid around my heart. Cardiologist and rheumatology appts in October  MRI makes me anxious, I have to hold my breath. I do not want to do another one if I do not have to.\"    She seemed to recall that given her age she would not need further surveillance, but according to last office visit with Dr Hill she would need one more annual image , then if stable no further surveillance.    Will route pt concern to Dr Hill and follow up with patient    Opal Mckeon, RN, BSN,   [facility]  Care coordinator       "

## 2021-08-09 ENCOUNTER — PATIENT OUTREACH (OUTPATIENT)
Dept: GASTROENTEROLOGY | Facility: CLINIC | Age: 76
End: 2021-08-09

## 2021-08-09 DIAGNOSIS — K86.2 PANCREAS CYST: Primary | ICD-10-CM

## 2021-08-09 NOTE — LETTER
August 19, 2021      Danielle Marmolejo  [address]   [facility] [address]        Dear ,    We are writing to inform you of your test results.    This CT scan was stable and there are no concerning changes in the pancreas. Given this stability and your age, as well as other health issues, there is no need for further follow-up of your pancreas unless you have symptoms.    SHAQUILLE Hill MD  Professor of Medicine  Division of Gastroenterology, Hepatology and Nutrition  [facility]      Resulted Orders   CT Abdomen Pelvis w Contrast    Narrative    CT of the Abdomen and Pelvis with contrast, 8/19/2021 9:44 AM.    Comparison: Abdominal MR 8/29/2020, 6/27/2019..    History: Pancreas cyst.     Technique: Axial images of the  abdomen and pelvis were obtained with  contrast. Coronal reconstructions were provided. Images were reviewed  in bone, lung, and soft tissue windows.     Findings:    Lung bases:   Heart size is normal. Esophagus is normal.. Bibasilar subsegmental  atelectasis. No pleural effusion. No suspicious or enlarged pulmonary  nodules.    Abdomen and Pelvis:   Liver is normal. No focal enhancing lesion. No intra or extrahepatic  biliary duct dilation. Gallbladder is unremarkable. Small splenic  cyst, otherwise normal.    Bilobed cystic structure in the pancreatic body measuring 18 x 8 mm  which appears contiguous with the main pancreatic duct  (series 5,  image 109). Additional cyst measuring up to 8 mm posteriorly in the  body of the pancreas(series 5, image 114). Findings are not  significantly changed since 6/15/2019 and likely represent side branch  IPMNs. Main pancreatic duct is not dilated.    No adrenal mass. Symmetric renal enhancement. No hydronephrosis or  calcified stone. Simple left renal cyst. Bladder is normal. Myomatous  uterus. No adnexal mass.    Stomach is decompressed. No small or large bowel wall thickening or  dilation. The appendix is not  well appreciated, however there are no  significant inflammatory changes in the right lower quadrant. No free  intraperitoneal air. No intra-abdominal or pelvic free fluid. No  portal venous gas or pneumatosis.    Abdominal aorta and major branches are normal in caliber and patent  with scattered predominantly aortoiliac atherosclerotic  calcifications. Main portal vein is patent.    No mesenteric, retroperitoneal, or pelvic lymphadenopathy.    Bones and Soft Tissues:   No suspicious osseous lesion. No suspicious mass. Multilevel  degenerative changes of the visualized thoracolumbar spine.      Impression    Impression:   1.  Cystic pancreatic lesions are unchanged since 6/15/2019 compatible  with side branch IPMNs.   2.  Additional incidental findings as described above report.    [facility] recommendations for asymptomatic pancreatic  cysts, modified from international consensus guidelines*   Size of largest cyst:   Less than 1 cm: Follow-up imaging in 6 months to 1 year, then lengthen  interval to 2-3 years if no change.  1-2 cm: Follow-up imaging at 6 months, then yearly for 2 years, then  lengthen interval if no change.   The optimal initial study or first follow-up exam is MRI/MRCP  performed with intravenous gadolinium contrast to allow full cyst  characterization. Once characterized, contrast is optional on  follow-up MRIs. If MRI is contraindicated, CT with contrast is  recommended.   GI consultation for possible endoscopic ultrasound is recommended for  cysts with the following features: Size > 2 cm, >20% growth on  followup, wall thickening or enhancement, mural nodule, duct > 5 mm,  or abrupt change in duct caliber with distal atrophy. GI or surgical  consultation is also recommended for symptomatic cysts.   *Reference: International Consensus Guidelines for Management of IPMN  and MCN of the pancreas. Pancreatology: 12:(2012); 183-197.    I have personally reviewed the examination and  initial interpretation  and I agree with the findings.    LEONARDO TOSCANO MD         SYSTEM ID:  [id number]

## 2021-08-09 NOTE — PROGRESS NOTES
Called patient to discuss option for CT scan since she voiced concern of needing MRI    I would recommend a CT abdomen pancreatic protocol, which should be easier, is not as dependent on breath holding and is less claustrophobic.    Pt in agreement with the CT scan. Last note from Dr Hill states no clinic appt needed. Mychart invitation sent to patient to assist with CT result communication    Message sent to clinic coordinators to scheduled    Opal Mckeon RN, BSN,   [facility]  Care coordinator

## 2021-08-19 ENCOUNTER — ANCILLARY PROCEDURE (OUTPATIENT)
Dept: CT IMAGING | Facility: CLINIC | Age: 76
End: 2021-08-19
Attending: INTERNAL MEDICINE
Payer: COMMERCIAL

## 2021-08-19 PROCEDURE — 74177 CT ABD & PELVIS W/CONTRAST: CPT | Mod: GC | Performed by: RADIOLOGY

## 2021-08-19 RX ORDER — IOPAMIDOL 755 MG/ML
82 INJECTION, SOLUTION INTRAVASCULAR ONCE
Status: COMPLETED | OUTPATIENT
Start: 2021-08-19 | End: 2021-08-19

## 2021-08-19 RX ADMIN — IOPAMIDOL 82 ML: 755 INJECTION, SOLUTION INTRAVASCULAR at 09:20

## 2021-08-20 ENCOUNTER — HOSPITAL ENCOUNTER (EMERGENCY)
Facility: CLINIC | Age: 76
Discharge: HOME | End: 2021-08-20
Attending: EMERGENCY MEDICINE | Admitting: EMERGENCY MEDICINE
Payer: COMMERCIAL

## 2021-08-20 VITALS
SYSTOLIC BLOOD PRESSURE: 169 MMHG | BODY MASS INDEX: 20.67 KG/M2 | TEMPERATURE: 97.5 F | WEIGHT: 132 LBS | OXYGEN SATURATION: 100 % | DIASTOLIC BLOOD PRESSURE: 77 MMHG | HEART RATE: 79 BPM | RESPIRATION RATE: 18 BRPM

## 2021-08-20 DIAGNOSIS — R53.83 FATIGUE, UNSPECIFIED TYPE: ICD-10-CM

## 2021-08-20 DIAGNOSIS — R07.89 CHEST TIGHTNESS: ICD-10-CM

## 2021-08-20 LAB
ALBUMIN SERPL-MCNC: 3.4 G/DL (ref 3.4–5)
ALP SERPL-CCNC: 110 U/L (ref 40–150)
ALT SERPL W P-5'-P-CCNC: 22 U/L (ref 0–50)
ANION GAP SERPL CALCULATED.3IONS-SCNC: 3 MMOL/L (ref 3–14)
AST SERPL W P-5'-P-CCNC: 18 U/L (ref 0–45)
BASOPHILS # BLD AUTO: 0 10E3/UL (ref 0–0.2)
BASOPHILS NFR BLD AUTO: 1 %
BILIRUB SERPL-MCNC: 0.5 MG/DL (ref 0.2–1.3)
BUN SERPL-MCNC: 14 MG/DL (ref 7–30)
CALCIUM SERPL-MCNC: 9.5 MG/DL (ref 8.5–10.1)
CHLORIDE BLD-SCNC: 103 MMOL/L (ref 94–109)
CO2 SERPL-SCNC: 30 MMOL/L (ref 20–32)
CREAT SERPL-MCNC: 0.79 MG/DL (ref 0.52–1.04)
EOSINOPHIL # BLD AUTO: 0.1 10E3/UL (ref 0–0.7)
EOSINOPHIL NFR BLD AUTO: 2 %
ERYTHROCYTE [DISTWIDTH] IN BLOOD BY AUTOMATED COUNT: 13.5 % (ref 10–15)
GFR SERPL CREATININE-BSD FRML MDRD: 73 ML/MIN/1.73M2
GLUCOSE BLD-MCNC: 93 MG/DL (ref 70–99)
HCT VFR BLD AUTO: 37.5 % (ref 35–47)
HGB BLD-MCNC: 12.2 G/DL (ref 11.7–15.7)
HOLD SPECIMEN: NORMAL
IMM GRANULOCYTES # BLD: 0 10E3/UL
IMM GRANULOCYTES NFR BLD: 0 %
LYMPHOCYTES # BLD AUTO: 1.1 10E3/UL (ref 0.8–5.3)
LYMPHOCYTES NFR BLD AUTO: 21 %
MCH RBC QN AUTO: 29.8 PG (ref 26.5–33)
MCHC RBC AUTO-ENTMCNC: 32.5 G/DL (ref 31.5–36.5)
MCV RBC AUTO: 92 FL (ref 78–100)
MONOCYTES # BLD AUTO: 0.6 10E3/UL (ref 0–1.3)
MONOCYTES NFR BLD AUTO: 12 %
NEUTROPHILS # BLD AUTO: 3.3 10E3/UL (ref 1.6–8.3)
NEUTROPHILS NFR BLD AUTO: 64 %
NRBC # BLD AUTO: 0 10E3/UL
NRBC BLD AUTO-RTO: 0 /100
PLATELET # BLD AUTO: 256 10E3/UL (ref 150–450)
POTASSIUM BLD-SCNC: 3.7 MMOL/L (ref 3.4–5.3)
PROT SERPL-MCNC: 6.6 G/DL (ref 6.8–8.8)
RBC # BLD AUTO: 4.1 10E6/UL (ref 3.8–5.2)
SODIUM SERPL-SCNC: 136 MMOL/L (ref 133–144)
TROPONIN I SERPL-MCNC: <0.015 UG/L (ref 0–0.04)
TSH SERPL DL<=0.005 MIU/L-ACNC: 0.79 MU/L (ref 0.4–4)
WBC # BLD AUTO: 5.2 10E3/UL (ref 4–11)

## 2021-08-20 PROCEDURE — 99285 EMERGENCY DEPT VISIT HI MDM: CPT | Mod: 25

## 2021-08-20 PROCEDURE — 93308 TTE F-UP OR LMTD: CPT

## 2021-08-20 PROCEDURE — 85004 AUTOMATED DIFF WBC COUNT: CPT | Performed by: EMERGENCY MEDICINE

## 2021-08-20 PROCEDURE — 84075 ASSAY ALKALINE PHOSPHATASE: CPT | Performed by: EMERGENCY MEDICINE

## 2021-08-20 PROCEDURE — 93005 ELECTROCARDIOGRAM TRACING: CPT

## 2021-08-20 PROCEDURE — 84443 ASSAY THYROID STIM HORMONE: CPT | Performed by: EMERGENCY MEDICINE

## 2021-08-20 PROCEDURE — 84484 ASSAY OF TROPONIN QUANT: CPT | Performed by: EMERGENCY MEDICINE

## 2021-08-20 PROCEDURE — 36415 COLL VENOUS BLD VENIPUNCTURE: CPT | Performed by: EMERGENCY MEDICINE

## 2021-08-20 ASSESSMENT — ENCOUNTER SYMPTOMS
SLEEP DISTURBANCE: 1
SHORTNESS OF BREATH: 0
FEVER: 0
FREQUENCY: 0
DYSURIA: 0
CHILLS: 0
NAUSEA: 0
HEMATURIA: 0
DIZZINESS: 1
ARTHRALGIAS: 1
ABDOMINAL PAIN: 0
FATIGUE: 1

## 2021-08-20 NOTE — ED TRIAGE NOTES
"Pt here with c/o generalized weakness and \"could just shut my eyes I'm so tired\" since BP med change a few weeks ago. No CP, SOB, cough, HA, visual changes, facial droop or slurred speech. Reports some dizziness at night. Took BP at grocery store today 97 and 124 systolic. ABC intact. A&Ox4        "

## 2021-08-20 NOTE — DISCHARGE INSTRUCTIONS
Please follow-up with your primary cardiologist for discussion about performing stress echo.  Please return to the emergency department with any chest pain or shortness of breath.

## 2021-08-20 NOTE — ED PROVIDER NOTES
History     Chief Complaint:  Fatigue    HPI   Danielle Marmolejo is a 75 year old female with a history of Polymyalgia rheumatic arthritis, osteopetrosis, and osteopenia who presents with fatigue. The patient reports having significant fatigue that is now interfering with her daily living. She reports some joint pain and recent diagnosis of RA. She reports that she is not sleeping well and having some anxiety and heart palpitations at night. The patient also reports that this winter while walking in [place] she had a few episodes of chest pressure. However, she reports that she walks on the treadmill daily without any chest pain or shortness of breath. She denies any leg pain or swelling. She also notes having blood pressure of 94 systolic today and reports some mild dizziness that has since resolved.  She denies current fever, chills, abdominal pain, nausea, vomiting, diarrhea or any urinary symptoms including dysuria, hematuria or frequency.     Review of Systems   Constitutional: Positive for fatigue. Negative for chills and fever.   Respiratory: Negative for shortness of breath.    Cardiovascular: Negative for leg swelling.   Gastrointestinal: Negative for abdominal pain and nausea.   Genitourinary: Negative for dysuria, frequency and hematuria.   Musculoskeletal: Positive for arthralgias.   Neurological: Positive for dizziness.   Psychiatric/Behavioral: Positive for sleep disturbance.   All other systems reviewed and are negative.    Allergies:  Sulfa drugs    Medications:    Mobic  Hydrochlorothiazide   Plaquenil     Past Medical History:    Arthritis  Constipation  Polymyalgia rheumatica  Small bowel obstruction  Chronic polyps  osteopenia  Chronic use of systemic steroids  Osteoporosis   Sciatica of right side   Pericardial effusion     Past Surgical History:    Left Femoral Hernia Repair with Mesh  lap done for polyp blocking ovaries-not cancerous or precancerous  Laparoscopy     Family History:     Mother: cervical cancer  Sister: breast cancer  Father: Rheum Arthritis    Social History:  History of alcohol use  Accompanied by     Physical Exam     Patient Vitals for the past 24 hrs:   BP Temp Temp src Pulse Resp SpO2 Weight   08/20/21 1745 (!) 169/77 -- -- -- -- 100 % --   08/20/21 1700 (!) 156/68 -- -- -- -- 100 % --   08/20/21 1336 (!) 147/61 97.5  F (36.4  C) Oral 79 18 99 % 59.9 kg (132 lb)       Physical Exam  General: Patient is awake, alert and interactive when I enter the room  Head: The scalp, face, and head appear normal  Eyes: The pupils are equal, round, and reactive to light. Conjunctivae and sclerae are normal  ENT: External acoustic canals are normal. The oropharynx is normal without erythema. Uvula is in the midline  Neck: Normal range of motion.   CV: Regular rate and rhythm.   Resp: Lungs are clear without wheezes or rales. No respiratory distress.   GI: Abdomen is soft, no rigidity, guarding, or rebound. No distension. No tenderness to palpation in any quadrant.     MS: Normal tone. Joints grossly normal without effusions. No asymmetric leg swelling, calf or thigh tenderness.    Skin: No rash or lesions noted. Normal capillary refill noted  Neuro: Speech is normal and fluent. Face is symmetric. Moving all extremities.   Psych:  Normal affect.  Appropriate interactions.    Emergency Department Course   ECG:  ECG taken at 1655, ECG read at 1657  Normal sinus rhythm   No as compared to prior, dated 6/14/19.  Rate 74 bpm. AL interval 176 ms. QRS duration 84 ms. QT/QTc 430/477 ms. P-R-T axes 77 60 62.   Laboratory:  CMP: Protein total 6.6 (L) o/w WNL (Creatinine 0.79)    Troponin (Collected 1645): <0.015    CBC: WBC 5.2, HGB 12.2,      TSH with few T4 reflex: 0.79     Procedures:     Limited Bedside Screening Ultrasound     Limited Bedside Cardiac Ultrasound, performed and interpreted by me.   Indication: weakness.   Parasternal long axis, parasternal short axis and subcostal  views were acquired.   Image quality was satisfactory.     Findings:     Global left ventricular function appears intact.   Chambers do not appear dilated.   Trace Pericardial Effusion     IMPRESSION: Grossly normal left ventricular function and chamber size.  Trace pericardial effusion..     Emergency Department Course:    Reviewed:  I reviewed nursing notes, vitals, past history and care everywhere    Assessments:  1620 I obtained history and examined the patient as noted above.     1748 I rechecked the patient and explained findings.     Disposition:  The patient was discharged to home.    Impression & Plan      Medical Decision Making:  Danielle is a very nice 75-year-old woman with past medical history of polymyalgia rheumatica, osteoporosis and osteopenia presents emergency department with chest pain and a few episodes of chest tightness.  She explains that these chest tightness episodes have not happened in quite some time with the last one occurring in March.  At this time she was walking outside in [place] and had a brief episode of chest pressure that resolved with rest.  However since then she has not experienced any similar episodes.  She notes that she is able to walk on the treadmill with a steady incline regularly without any significant chest pain or shortness of breath.  She most recently worked out yesterday without any significant complaints.  However this morning when she was picking up some groceries and medications at the grocery store she took her blood pressure and was noted to have a systolic blood pressure of 94.  She was mildly dizzy and felt like she may pass out.  Therefore the pharmacist referred her to the emergency department for further evaluation and treatment.  Currently she is asymptomatic and feels well.    On initial evaluation here she is hemodynamically stable with normal vital signs.  She is afebrile.  She is oxygenating well on room air.  She clinically looks quite well.  She  is a very normal physical exam.  However she reports to me that she has been intermittently feeling more fatigued with her activities of daily living however still able to exercise and exert herself without any significant trouble.  She also was recently evaluated by her cardiologist at [facility] due to some findings that she had on an echocardiogram.  Echo noted some thickening of the mitral valve leaflets and a small pericardial effusion.  I reviewed this cardiology appointment and at this point they are going to watch her closely but no specific interventions were noted.  I performed a bedside echo today which shows a trivial pericardial effusion which is unlikely to be hemodynamically significant.  Grossly her ventricular function appears to be intact.  No other acute abnormalities were noted on my work-up.  Blood work was obtained which is largely unremarkable with normal electrolytes, no evidence of leukocytosis and normal thyroid studies.  EKG was obtained which did not show any acute signs of ischemia nor dysrhythmia.  Troponin was negative.  Given her good cardiac stamina and negative cardiac work-up here in the emergency department it seems less likely that this is a cardiac cause for her intermittent fatigue.  Her fatigue very well may be rheumatologic or secondary to ongoing stress and difficulty sleeping.  However at this point I feel she has a safe for discharge home and can follow-up closely with her primary care doctor.  We also discussed following up closely with her cardiologist and considering a possible stress test.    Diagnosis:    ICD-10-CM    1. Fatigue, unspecified type  R53.83    2. Chest tightness  R07.89        Scribe Disclosure:  Deonte WILL, am serving as a scribe at 4:17 PM on 8/20/2021 to document services personally performed by Mohinder Neri MD based on my observations and the provider's statements to me.     Scribe Disclosure:  SUMAN, Dori Tijerina, am  serving as a scribe at 6:49 PM on 8/20/2021 to document services personally performed by Mohinder Neri MD  based on my observations and the provider's statements to me.       Mohinder Neri MD  08/20/21 0997

## 2021-08-23 LAB
ATRIAL RATE - MUSE: 74 BPM
CREAT BLD-MCNC: 0.9 MG/DL (ref 0.5–1)
DIASTOLIC BLOOD PRESSURE - MUSE: NORMAL MMHG
GFR SERPL CREATININE-BSD FRML MDRD: >60 ML/MIN/1.73M2
INTERPRETATION ECG - MUSE: NORMAL
P AXIS - MUSE: 77 DEGREES
PR INTERVAL - MUSE: 176 MS
QRS DURATION - MUSE: 84 MS
QT - MUSE: 430 MS
QTC - MUSE: 477 MS
R AXIS - MUSE: 60 DEGREES
SYSTOLIC BLOOD PRESSURE - MUSE: NORMAL MMHG
T AXIS - MUSE: 62 DEGREES
VENTRICULAR RATE- MUSE: 74 BPM

## 2024-06-06 NOTE — DISCHARGE INSTRUCTIONS
HOME CARE FOLLOWING ABDOMINAL SURGERY  MIKE Joshi, GALE Pratt R. O Donnell, J. Shaheen    Special instructions for Danielle Marmolejo:  --Surgery scheduler will contact you to arrange surgery in about 2 weeks with Dr Samuels for hernia repair.  Also to arrange MRI to follow-up on CT finding.    Surgery office phone number: [phone]  -Primary care provider follow-up regarding high blood pressure per Hospitalist recommendations.     INCISIONAL CARE:  Replace the bandage over your incisions until all drainage stops, or if more comfortable to have in place.  If present, leave the steri-strips (white paper tapes) in place for 14 days after surgery.      BATHING:  Avoid baths for 1 week after surgery.  Showers are okay.  You may wash your hair at any time.  Gently pat your incision dry after bathing.    ACTIVITY:  Light Activity -- you may immediately be up and about as tolerated.  Driving -- you may drive when comfortable and off narcotic pain medications.  Light Work -- resume when comfortable off pain medications.  (If you can drive, you probably can work.)  Strenuous Work/Activity -- limit lifting to 20 pounds for 4 weeks.  Then, progressively increase with time.  Active Sports (running, biking, etc.) -- cautiously resume after 6 weeks.    DISCOMFORT:  Use pain medications as prescribed by your surgeon.  Take the pain medication with some food, when possible, to minimize side effects.  Expect gradual improvement.    DIET:  Soft diet, small meals for one week or until all abdominal sensitivity resolves.  Drink plenty of fluids.  While taking pain medications, increase dietary fiber or add a fiber supplementation like Metamucil or Citrucel to help prevent constipation - a possible side effect of pain medications.    NAUSEA:  If nauseated from the anesthetic/pain meds; rest in bed, get up cautiously with assistance, and drink clear liquids (juice, tea, broth).       CONTACT US IF THE  Instructions for your surgery at [facility]      Today's Date:  6/6/2024      Patient's Name:  Evelyn Reed           Surgery Date:  6/19              Please enter the main entrance of the hospital and check-in at the  located in the lobby. Once checked in at the , you will take the elevators to the second floor, and report to the waiting room on the left. The room will say Procedure Registration.    Do NOT eat or drink anything, including candy, gum, or ice chips after midnight prior to your surgery, unless you have specific instructions from your surgeon or anesthesia provider to do so.  Brush your teeth before coming to the hospital. You may swish with water, but do not swallow.  No smoking/Vaping/E-Cigarettes 24 hours prior to the day of surgery.  No alcohol 24 hours prior to the day of surgery.  No recreational drugs for one week prior to the day of surgery.  Bring Photo ID, Insurance information, and Co-pay if required on day of surgery.  Bring in pertinent legal documents, such as, Medical Power of , DNR, Advance Directive, etc.  Leave all valuables, including money/purse, at home.  Remove all jewelry, including ALL body piercings, nail polish, acrylic nails, and makeup (including mascara); no lotions, powders, deodorant, or perfume/cologne/after shave on the skin.  Follow instruction for Hibiclens washes and CHG wipes from surgeon's office.   Glasses and dentures may be worn to the hospital. They must be removed prior to surgery. Please bring case/container for glasses or dentures.   Contact lenses should not be worn on day of surgery.   Call your doctor's office if symptoms of a cold or illness develop within 24-48 hours prior to your surgery.  Call your doctor's office if you have any questions concerning insurance or co-pays.  15. AN ADULT (relative or friend 18 years or older) MUST DRIVE YOU HOME AFTER YOUR SURGERY.  16. Please make arrangements for a  FOLLOWING DEVELOPS:   1. A fever that is above 101     2. If there is a large amount of drainage, bleeding, or swelling.   3. Severe pain that is not relieved by your prescription.   4. Drainage that is thick, cloudy, yellow, green or white.   5. Any other questions not answered by  Frequently Asked Questions  sheet.      FREQUENTLY ASKED QUESTIONS:    Q:  How should my incision look?    A:  Normally your incision will appear slightly swollen with light redness directly along the incision itself as it heals.  It may feel like a bump or ridge as the healing/scarring happens, and over time (3-4 months) this bump or ridge feeling should slowly go away.  In general, clear or pink watery drainage can be normal at first as your incision heals, but should decrease over time.    Q:  How do I know if my incision is infected?  A:  Look at your incision for signs of infection, like redness around the incision spreading to surrounding skin, or drainage of cloudy or foul-smelling drainage.  If you feel warm, check your temperature to see if you are running a fever.    **If any of these things occur, please notify the nurse at our office.  We may need you to come into the office for an incision check.      Q:  How do I take care of my incision?  A:  If you have a dressing in place - Starting the day after surgery, replace the dressing 1-2 times a day until there is no further drainage from the incision.  At that time, a dressing is no longer needed.  Try to minimize tape on the skin if irritation is occurring at the tape sites.  If you have significant irritation from tape on the skin, please call the office to discuss other method of dressing your incision.    Small pieces of tape called  steri-strips  may be present directly overlying your incision; these may be removed 10 days after surgery unless otherwise specified by your surgeon.  If these tapes start to loosen at the ends, you may trim them back until they fall off or are  removed.    A:  If you had  Dermabond  tissue glue used as a dressing (this causes your incision to look shiny with a clear covering over it) - This type of dressing wears off with time and does not require more dressings over the top unless it is draining around the glue as it wears off.  Do not apply ointments or lotions over the incisions until the glue has completely worn off.    Q:  There is a piece of tape or a sticky  lead  still on my skin.  Can I remove this?  A:  Sometimes the sticky  leads  used for monitoring during surgery or for evaluation in the emergency department are not all removed while you are in the hospital.  These sometimes have a tab or metal dot on them.  You can easily remove these on your own, like taking off a band-aid.  If there is a gel substance under the  lead , simply wipe/clean it off with a washcloth or paper towel.      Q:  What can I do to minimize constipation (very hard stools, or lack of stools)?  A:  Stay well hydrated.  Increase your dietary fiber intake or take a fiber supplement -with plenty of water.  Walk around frequently.  You may consider an over-the-counter stool-softener.  Your Pharmacist can assist you with choosing one that is stocked at your pharmacy.  Constipation is also one of the most common side effects of pain medication.  If you are using pain medication, be pro-active and try to PREVENT problems with constipation by taking the steps above BEFORE constipation becomes a problem.    Q:  What do I do if I need more pain medications?  A:  Call the office to receive refills.  Be aware that certain pain meds cannot be called into a pharmacy and actually require a paper prescription.  A change may be made in your pain med as you progress thru your recovery period or if you have side effects to certain meds.    --Pain meds are NOT refilled after 5pm on weekdays, and NOT AT ALL on the weekends, so please look ahead to prevent problems.      Q:  Why am I having a  hard time sleeping now that I am at home?  A:  Many medications you receive while you are in the hospital can impact your sleep for a number of days after your surgery/hospitalization.  Decreased level of activity and naps during the day may also make sleeping at night difficult.  Try to minimize day-time naps, and get up frequently during the day to walk around your home during your recovery time.  Sleep aides may be of some help, but are not recommended for long-term use.      Q:  I am having some back discomfort.  What should I do?  A:  This may be related to certain positioning that was required for your surgery, extended periods of time in bed, or other changes in your overall activity level.  You may try ice, heat, acetaminophen, or ibuprofen to treat this temporarily.  Note that many pain medications have acetaminophen in them and would state this on the prescription bottle.  Be sure not to exceed the maximum of 4000mg per day of acetaminophen.     **If the pain you are having does not resolve, is severe, or is a flare of back pain you have had on other occasions prior to surgery, please contact your primary physician for further recommendations or for an appointment to be examined at their office.    Q:  Why am I having headaches?  A:  Headaches can be caused by many things:  caffeine withdrawal, use of pain meds, dehydration, high blood pressure, lack of sleep, over-activity/exhaustion, flare-up of usual migraine headaches.  If you feel this is related to muscle tension (a band-like feeling around the head, or a pressure at the low-back of the head) you may try ice or heat to this area.  You may need to drink more fluids (try electrolyte drink like Gatorade), rest, or take your usual migraine medications.   **If your headaches do not resolve, worsen, are accompanied by other symptoms, or if your blood pressure is high, please call your primary physician for recommendation and/or examination.    Q:  I am  unable to urinate.  What do I do?  A:  A small percentage of people can have difficulty urinating initially after surgery.  This includes being able to urinate only a very small amount at a time and feeling discomfort or pressure in the very low abdomen.  This is called  urinary retention , and is actually an urgent situation.  Proceed to your nearest Emergency department for evaluation (not an Urgent Care Center).  Sometimes the bladder does not work correctly after certain medications you receive during surgery, or related to certain procedures.  You may need to have a catheter placed until your bladder recovers.  When planning to go to an Emergency department, it may help to call the ER to let them know you are coming in for this problem after a surgery.  This may help you get in quicker to be evaluated.  **If you have symptoms of a urinary tract infection, please contact your primary physician for the proper evaluation and treatment.          If you have other questions, please call the office Monday thru Friday between 8am and 5pm to discuss with the nurse or physician assistant.  #[phone]    There is a surgeon ON CALL on weekday evenings and over the weekend in case of urgent need only, and may be contacted at the same number.    If you are having an emergency, call 911 or proceed to your nearest emergency department.

## (undated) DEVICE — DAVINCI S CANNULA SEAL 8.5-13MM 420206

## (undated) DEVICE — ENDO TROCAR SLEEVE KII Z-THREADED 05X100MM CTS02

## (undated) DEVICE — SU WND CLOSURE VLOC 90 ABS 3-0 VIOLET 6" CV-23 VLOCM0804

## (undated) DEVICE — Device

## (undated) DEVICE — SYSTEM CLEARIFY VISUALIZATION 21-345

## (undated) DEVICE — GLOVE PROTEXIS POWDER FREE SMT 7.5  2D72PT75X

## (undated) DEVICE — ESU CORD MONOPOLAR 10'  E0510

## (undated) DEVICE — DRAPE IOBAN INCISE 23X17" 6650EZ

## (undated) DEVICE — LINEN ORTHO ACL PACK 5447

## (undated) DEVICE — CATH TRAY FOLEY SURESTEP 16FR DRAIN BAG STATOCK A899916

## (undated) DEVICE — SU VICRYL 0 CT-2 CR 8X18" J727D

## (undated) DEVICE — SUCTION TIP POOLE K770

## (undated) DEVICE — ESU PENCIL W/HOLSTER E2350H

## (undated) DEVICE — LUBRICANT INST ELECTROLUBE EL101

## (undated) DEVICE — SU VICRYL 4-0 PS-2 18" UND J496H

## (undated) DEVICE — ESU ELEC BLADE 2.75" COATED/INSULATED E1455

## (undated) DEVICE — DAVINCI HOT SHEARS TIP COVER  400180

## (undated) DEVICE — ESU GROUND PAD ADULT W/CORD E7507

## (undated) DEVICE — GLOVE PROTEXIS W/NEU-THERA 7.0  2D73TE70

## (undated) DEVICE — LINEN FULL SHEET 5511

## (undated) DEVICE — BAG CLEAR TRASH 1.3M 39X33" P4040C

## (undated) DEVICE — GLOVE PROTEXIS W/NEU-THERA 8.0  2D73TE80

## (undated) DEVICE — PREP CHLORAPREP 26ML TINTED ORANGE  260815

## (undated) DEVICE — LINEN POUCH DBL 5427

## (undated) DEVICE — SUCTION TIP YANKAUER W/O VENT K86

## (undated) DEVICE — GLOVE PROTEXIS POWDER FREE 8.0 ORTHOPEDIC 2D73ET80

## (undated) DEVICE — ENDO TROCAR OPTICAL ACCESS KII Z-THRD 12X100MM C0R85

## (undated) DEVICE — GLOVE PROTEXIS BLUE W/NEU-THERA 7.5  2D73EB75

## (undated) DEVICE — LIGHT HANDLE X2

## (undated) DEVICE — SUCTION CANISTER MEDIVAC LINER 3000ML W/LID 65651-530

## (undated) DEVICE — SU VICRYL 3-0 SH 27" J316H

## (undated) DEVICE — SU VICRYL 4-0 P-3 18" UND J494G

## (undated) DEVICE — DAVINCI SI DRAPE ACCESSORY KIT 3-ARM 420290

## (undated) DEVICE — TUBING SUCTION 12"X1/4" N612

## (undated) DEVICE — PACK SET-UP STD 9102

## (undated) DEVICE — BLADE CLIPPER 3M 9670

## (undated) DEVICE — SOL WATER IRRIG 1000ML BOTTLE 2F7114

## (undated) DEVICE — ENDO TROCAR FIRST ENTRY KII FIOS Z-THRD 05X100MM CTF03

## (undated) DEVICE — LINEN HALF SHEET 5512

## (undated) DEVICE — DAVINCI OBTURATOR 8MM BLADELESS 420023

## (undated) DEVICE — DRSG GAUZE 2X2" 8042

## (undated) RX ORDER — ONDANSETRON 2 MG/ML
INJECTION INTRAMUSCULAR; INTRAVENOUS
Status: DISPENSED
Start: 2019-06-15

## (undated) RX ORDER — DEXAMETHASONE SODIUM PHOSPHATE 4 MG/ML
INJECTION, SOLUTION INTRA-ARTICULAR; INTRALESIONAL; INTRAMUSCULAR; INTRAVENOUS; SOFT TISSUE
Status: DISPENSED
Start: 2019-06-15

## (undated) RX ORDER — HYDROMORPHONE HYDROCHLORIDE 1 MG/ML
INJECTION, SOLUTION INTRAMUSCULAR; INTRAVENOUS; SUBCUTANEOUS
Status: DISPENSED
Start: 2019-06-15

## (undated) RX ORDER — FENTANYL CITRATE 50 UG/ML
INJECTION, SOLUTION INTRAMUSCULAR; INTRAVENOUS
Status: DISPENSED
Start: 2019-06-15

## (undated) RX ORDER — FENTANYL CITRATE 50 UG/ML
INJECTION, SOLUTION INTRAMUSCULAR; INTRAVENOUS
Status: DISPENSED
Start: 2019-07-02

## (undated) RX ORDER — FENTANYL CITRATE 50 UG/ML
INJECTION, SOLUTION INTRAMUSCULAR; INTRAVENOUS
Status: DISPENSED
Start: 2019-08-13

## (undated) RX ORDER — LABETALOL 20 MG/4 ML (5 MG/ML) INTRAVENOUS SYRINGE
Status: DISPENSED
Start: 2019-07-02

## (undated) RX ORDER — LEVOFLOXACIN 5 MG/ML
INJECTION, SOLUTION INTRAVENOUS
Status: DISPENSED
Start: 2019-08-13

## (undated) RX ORDER — PROPOFOL 10 MG/ML
INJECTION, EMULSION INTRAVENOUS
Status: DISPENSED
Start: 2019-07-02

## (undated) RX ORDER — GLYCOPYRROLATE 0.2 MG/ML
INJECTION INTRAMUSCULAR; INTRAVENOUS
Status: DISPENSED
Start: 2019-07-02

## (undated) RX ORDER — PROPOFOL 10 MG/ML
INJECTION, EMULSION INTRAVENOUS
Status: DISPENSED
Start: 2019-06-15

## (undated) RX ORDER — BUPIVACAINE HYDROCHLORIDE AND EPINEPHRINE 5; 5 MG/ML; UG/ML
INJECTION, SOLUTION EPIDURAL; INTRACAUDAL; PERINEURAL
Status: DISPENSED
Start: 2019-07-02

## (undated) RX ORDER — DEXAMETHASONE SODIUM PHOSPHATE 4 MG/ML
INJECTION, SOLUTION INTRA-ARTICULAR; INTRALESIONAL; INTRAMUSCULAR; INTRAVENOUS; SOFT TISSUE
Status: DISPENSED
Start: 2019-07-02

## (undated) RX ORDER — ONDANSETRON 2 MG/ML
INJECTION INTRAMUSCULAR; INTRAVENOUS
Status: DISPENSED
Start: 2019-07-02

## (undated) RX ORDER — NEOSTIGMINE METHYLSULFATE 1 MG/ML
VIAL (ML) INJECTION
Status: DISPENSED
Start: 2019-07-02

## (undated) RX ORDER — BUPIVACAINE HYDROCHLORIDE AND EPINEPHRINE 5; 5 MG/ML; UG/ML
INJECTION, SOLUTION EPIDURAL; INTRACAUDAL; PERINEURAL
Status: DISPENSED
Start: 2019-06-15

## (undated) RX ORDER — GLYCOPYRROLATE 0.2 MG/ML
INJECTION INTRAMUSCULAR; INTRAVENOUS
Status: DISPENSED
Start: 2019-06-15

## (undated) RX ORDER — OXYCODONE HYDROCHLORIDE 5 MG/1
TABLET ORAL
Status: DISPENSED
Start: 2019-07-02

## (undated) RX ORDER — LIDOCAINE HYDROCHLORIDE 20 MG/ML
INJECTION, SOLUTION EPIDURAL; INFILTRATION; INTRACAUDAL; PERINEURAL
Status: DISPENSED
Start: 2019-08-13

## (undated) RX ORDER — LIDOCAINE HYDROCHLORIDE 10 MG/ML
INJECTION, SOLUTION EPIDURAL; INFILTRATION; INTRACAUDAL; PERINEURAL
Status: DISPENSED
Start: 2019-07-02

## (undated) RX ORDER — LIDOCAINE HYDROCHLORIDE 20 MG/ML
INJECTION, SOLUTION EPIDURAL; INFILTRATION; INTRACAUDAL; PERINEURAL
Status: DISPENSED
Start: 2019-06-15

## (undated) RX ORDER — CEFAZOLIN SODIUM 2 G/100ML
INJECTION, SOLUTION INTRAVENOUS
Status: DISPENSED
Start: 2019-07-02

## (undated) RX ORDER — HYDROMORPHONE HYDROCHLORIDE 1 MG/ML
INJECTION, SOLUTION INTRAMUSCULAR; INTRAVENOUS; SUBCUTANEOUS
Status: DISPENSED
Start: 2019-07-02